# Patient Record
Sex: FEMALE | Race: WHITE | NOT HISPANIC OR LATINO | Employment: STUDENT | URBAN - METROPOLITAN AREA
[De-identification: names, ages, dates, MRNs, and addresses within clinical notes are randomized per-mention and may not be internally consistent; named-entity substitution may affect disease eponyms.]

---

## 2017-01-17 ENCOUNTER — OFFICE VISIT (OUTPATIENT)
Dept: OBSTETRICS AND GYNECOLOGY | Facility: CLINIC | Age: 22
End: 2017-01-17
Payer: MEDICARE

## 2017-01-17 VITALS
WEIGHT: 209 LBS | HEIGHT: 63 IN | SYSTOLIC BLOOD PRESSURE: 122 MMHG | DIASTOLIC BLOOD PRESSURE: 78 MMHG | BODY MASS INDEX: 37.03 KG/M2

## 2017-01-17 DIAGNOSIS — Z01.419 ROUTINE GYNECOLOGICAL EXAMINATION: ICD-10-CM

## 2017-01-17 DIAGNOSIS — Z91.89 GYN EXAM FOR HIGH-RISK MEDICARE PATIENT: Primary | ICD-10-CM

## 2017-01-17 PROCEDURE — G0101 CA SCREEN;PELVIC/BREAST EXAM: HCPCS | Mod: PBBFAC,PO | Performed by: OBSTETRICS & GYNECOLOGY

## 2017-01-17 PROCEDURE — G0101 CA SCREEN;PELVIC/BREAST EXAM: HCPCS | Mod: S$PBB,,, | Performed by: OBSTETRICS & GYNECOLOGY

## 2017-01-17 PROCEDURE — 88175 CYTOPATH C/V AUTO FLUID REDO: CPT

## 2017-01-17 PROCEDURE — 99999 PR PBB SHADOW E&M-EST. PATIENT-LVL III: CPT | Mod: PBBFAC,,, | Performed by: OBSTETRICS & GYNECOLOGY

## 2017-01-17 PROCEDURE — 99213 OFFICE O/P EST LOW 20 MIN: CPT | Mod: PBBFAC,PO | Performed by: OBSTETRICS & GYNECOLOGY

## 2017-01-17 NOTE — PROGRESS NOTES
Chief Complaint   Patient presents with    Well Woman     no problems        History of Present Illness: Tina Case is a 21 y.o. female that presents today 2017 for well gyn visit.    Past Medical History   Diagnosis Date    Allergy     Anxiety     Bipolar disorder     Schizophrenia        Past Surgical History   Procedure Laterality Date    Incontinence surgery         Current Outpatient Prescriptions   Medication Sig Dispense Refill    busPIRone (BUSPAR) 10 MG tablet Take 1 tablet by mouth once daily.      cetirizine (ZYRTEC) 10 MG tablet Take 1 tablet (10 mg total) by mouth once daily. 30 tablet 2    dextroamphetamine-amphetamine (ADDERALL) 20 mg tablet Take 1 tablet by mouth once daily.      escitalopram oxalate (LEXAPRO) 20 MG tablet Take 1 tablet by mouth every evening.  0    guanfacine (TENEX) 2 MG tablet Take 11 tablets by mouth once daily.      metformin (GLUCOPHAGE) 500 MG tablet Take 500 mg by mouth nightly.       No current facility-administered medications for this visit.        Review of patient's allergies indicates:  No Known Allergies    Family History   Problem Relation Age of Onset    Family history unknown: Yes       Social History     Social History    Marital status: Single     Spouse name: N/A    Number of children: N/A    Years of education: N/A     Social History Main Topics    Smoking status: Never Smoker    Smokeless tobacco: Never Used    Alcohol use No    Drug use: No    Sexual activity: No     Other Topics Concern    None     Social History Narrative       OB History    Para Term  AB SAB TAB Ectopic Multiple Living   0                      Review of Symptoms:  GENERAL: Denies weight gain or weight loss. Feeling well overall.   SKIN: Denies rash or lesions.   HEAD: Denies head injury or headache.   NODES: Denies enlarged lymph nodes.   CHEST: Denies chest pain or shortness of breath.   CARDIOVASCULAR: Denies palpitations or left sided chest  "pain.   ABDOMEN: No abdominal pain, constipation, diarrhea, nausea, vomiting or rectal bleeding.   URINARY: No frequency, dysuria, hematuria, or burning on urination.  HEMATOLOGIC: No easy bruisability or excessive bleeding.   MUSCULOSKELETAL: Denies joint pain or swelling.     Visit Vitals    /78    Ht 5' 3" (1.6 m)    Wt 94.8 kg (208 lb 15.9 oz)    LMP 12/03/2016 (Exact Date)     Physical Exam:  APPEARANCE: Well nourished, well developed, in no acute distress.  SKIN: Normal skin turgor, no lesions.  NECK: Neck symmetric without masses   RESPIRATORY: Normal respiratory effort with no retractions or use of accessory muscles  CARDIOVASCULAR: Peripheral vascular system with no swelling no varicosities and palpation of pulses normal  LYMPHATIC: No enlargements of the lymph nodes noted in the neck, axillae, or groin  ABDOMEN: Soft. No tenderness or masses. No hepatosplenomegaly. No hernias.  BREASTS: Symmetrical, no skin changes or visible lesions. No palpable masses, nipple discharge or adenopathy bilaterally.  PELVIC: Normal external female genitalia without lesions. Normal hair distribution. Adequate perineal body, normal urethral meatus. Urethra with no masses.  Bladder nontender. Vagina moist and well rugated without lesions or discharge. Cervix pink and without lesions. No significant cystocele or rectocele. Bimanual exam showed uterus normal size, shape, position, mobile and nontender. Adnexa without masses or tenderness. Urethra and bladder normal. PAP DONE  EXTREMITIES: No clubbing cyanosis or edema.    ASSESSMENT/PLAN:  GYN exam for high-risk Medicare patient    Routine gynecological examination  -     Liquid-based pap smear, screening          Patient was counseled today on Pap guidelines, recommendation for pelvic exams, mammograms every other year after the age of 40 and annually after the age of 50, Colonoscopy after the age of 50, Dexa Bone Scan and calcium and vitamin D supplementation in " menopause and to see her PCP for other health maintenance.   FOLLOW-UP:prn

## 2017-01-17 NOTE — MR AVS SNAPSHOT
[facility] - OB/GYN  [address] [place] [address]  Phone: [phone]                  Tina Case   2017 1:00 PM   Office Visit    Description:  Female : 1995   Provider:  Elizabeth Marvin MD   Department:  [facility] - OB/GYN           Reason for Visit     Well Woman           Diagnoses this Visit        Comments    Routine gynecological examination    -  Primary            To Do List           Goals (5 Years of Data)     None      [facility]     [facility] On Call Nurse Care Line -  Assistance  Registered nurses in the [facility] Call Center provide clinical advisement, health education, appointment booking, and other advisory services.  Call for this free service at [phone].             Medications           Message regarding Medications     Verify the changes and/or additions to your medication regime listed below are the same as discussed with your clinician today.  If any of these changes or additions are incorrect, please notify your healthcare provider.             Verify that the below list of medications is an accurate representation of the medications you are currently taking.  If none reported, the list may be blank. If incorrect, please contact your healthcare provider. Carry this list with you in case of emergency.           Current Medications     busPIRone (BUSPAR) 10 MG tablet Take 1 tablet by mouth once daily.    cetirizine (ZYRTEC) 10 MG tablet Take 1 tablet (10 mg total) by mouth once daily.    dextroamphetamine-amphetamine (ADDERALL) 20 mg tablet Take 1 tablet by mouth once daily.    escitalopram oxalate (LEXAPRO) 20 MG tablet Take 1 tablet by mouth every evening.    guanfacine (TENEX) 2 MG tablet Take 11 tablets by mouth once daily.    metformin (GLUCOPHAGE) 500 MG tablet Take 500 mg by mouth nightly.           Clinical Reference Information           Vital Signs - Last Recorded  Most recent update: 2017  1:10 PM by Daniela MUNSON  "BIN GarcíaN    BP Ht Wt LMP BMI    122/78 5' 3" (1.6 m) 94.8 kg (208 lb 15.9 oz) 12/03/2016 (Exact Date) 37.02 kg/m2      Blood Pressure          Most Recent Value    BP  122/78      Allergies as of 1/17/2017     No Known Allergies      Immunizations Administered on Date of Encounter - 1/17/2017     None      Orders Placed During Today's Visit      Normal Orders This Visit    Liquid-based pap smear, screening       "

## 2017-05-08 PROBLEM — F41.9 ANXIETY AND DEPRESSION: Status: ACTIVE | Noted: 2017-05-08

## 2017-05-08 PROBLEM — F32.A ANXIETY AND DEPRESSION: Status: ACTIVE | Noted: 2017-05-08

## 2017-05-08 PROBLEM — F98.8 ADD (ATTENTION DEFICIT DISORDER): Status: ACTIVE | Noted: 2017-05-08

## 2017-07-13 ENCOUNTER — TELEPHONE (OUTPATIENT)
Dept: OBSTETRICS AND GYNECOLOGY | Facility: CLINIC | Age: 22
End: 2017-07-13

## 2017-07-13 NOTE — TELEPHONE ENCOUNTER
----- Message from Alden Gordon sent at 7/13/2017  8:55 AM CDT -----  Contact: Self-  [phone]  Patient called asking for an approval from the doctor to get new rx cream from a different provider.. The patient had a saliva test done recently. Thanks!

## 2017-07-31 ENCOUNTER — OFFICE VISIT (OUTPATIENT)
Dept: OBSTETRICS AND GYNECOLOGY | Facility: CLINIC | Age: 22
End: 2017-07-31
Payer: MEDICARE

## 2017-07-31 VITALS
HEIGHT: 63 IN | BODY MASS INDEX: 38.79 KG/M2 | SYSTOLIC BLOOD PRESSURE: 104 MMHG | WEIGHT: 218.94 LBS | DIASTOLIC BLOOD PRESSURE: 66 MMHG

## 2017-07-31 DIAGNOSIS — R63.5 WEIGHT GAIN: ICD-10-CM

## 2017-07-31 DIAGNOSIS — N92.1 METRORRHAGIA: Primary | ICD-10-CM

## 2017-07-31 DIAGNOSIS — E66.9 OBESITY (BMI 30-39.9): ICD-10-CM

## 2017-07-31 PROCEDURE — 99213 OFFICE O/P EST LOW 20 MIN: CPT | Mod: S$PBB,,, | Performed by: OBSTETRICS & GYNECOLOGY

## 2017-07-31 PROCEDURE — 99999 PR PBB SHADOW E&M-EST. PATIENT-LVL III: CPT | Mod: PBBFAC,,, | Performed by: OBSTETRICS & GYNECOLOGY

## 2017-07-31 PROCEDURE — 99213 OFFICE O/P EST LOW 20 MIN: CPT | Mod: PBBFAC,PN | Performed by: OBSTETRICS & GYNECOLOGY

## 2017-07-31 RX ORDER — ACETAMINOPHEN AND CODEINE PHOSPHATE 120; 12 MG/5ML; MG/5ML
1 SOLUTION ORAL DAILY
Qty: 30 TABLET | Refills: 11 | Status: SHIPPED | OUTPATIENT
Start: 2017-07-31 | End: 2017-09-22 | Stop reason: SDUPTHER

## 2017-07-31 NOTE — PROGRESS NOTES
Chief Complaint   Patient presents with    discuss HRT       History of Present Illness: Tina Case is a 21 y.o. female that presents today 7/31/2017 for   Chief Complaint   Patient presents with    discuss HRT     She reports light spotting for 1 day periods every 4 months. But, LMP 6/10 for 2 weeks with pad changes every 3 hours for 3 days. She thinks prior to that she had a period in February. She says this has been going on for years. She reports that she has tried OCPs and she felt angry on this.     Past Medical History:   Diagnosis Date    Allergy     Anxiety     Bipolar disorder     Schizophrenia        Past Surgical History:   Procedure Laterality Date    INCONTINENCE SURGERY         Current Outpatient Prescriptions   Medication Sig Dispense Refill    busPIRone (BUSPAR) 10 MG tablet Take 1 tablet by mouth once daily.      dextroamphetamine-amphetamine (ADDERALL) 20 mg tablet Take 1 tablet by mouth once daily.      ergocalciferol (ERGOCALCIFEROL) 50,000 unit Cap Take 1 capsule (50,000 Units total) by mouth every 7 days. 4 capsule 2    escitalopram oxalate (LEXAPRO) 20 MG tablet Take 1 tablet by mouth every evening.  0    guanfacine (TENEX) 2 MG tablet Take 11 tablets by mouth once daily.      metformin (GLUCOPHAGE) 1000 MG tablet Take 1 tablet (1,000 mg total) by mouth 2 (two) times daily with meals. 180 tablet 3    cetirizine (ZYRTEC) 10 MG tablet Take 1 tablet (10 mg total) by mouth once daily. 30 tablet 2    norethindrone (MICRONOR) 0.35 mg tablet Take 1 tablet (0.35 mg total) by mouth once daily. 30 tablet 11     No current facility-administered medications for this visit.        Review of patient's allergies indicates:  No Known Allergies    Family History   Problem Relation Age of Onset    Breast cancer Neg Hx     Ovarian cancer Neg Hx        Social History   Substance Use Topics    Smoking status: Never Smoker    Smokeless tobacco: Never Used    Alcohol use No       OB  "History    Para Term  AB Living   0             SAB TAB Ectopic Multiple Live Births                         Review of Symptoms:  GENERAL: Denies weight gain or weight loss. Feeling well overall.   SKIN: Denies rash or lesions.   HEAD: Denies head injury or headache.   NODES: Denies enlarged lymph nodes.   CHEST: Denies chest pain or shortness of breath.   CARDIOVASCULAR: Denies palpitations or left sided chest pain.   ABDOMEN: No abdominal pain, constipation, diarrhea, nausea, vomiting or rectal bleeding.   URINARY: No frequency, dysuria, hematuria, or burning on urination.  HEMATOLOGIC: No easy bruisability or excessive bleeding.   MUSCULOSKELETAL: Denies joint pain or swelling.     /66   Ht 5' 3" (1.6 m)   Wt 99.3 kg (218 lb 14.7 oz)   LMP 06/10/2017 (Approximate)     ASSESSMENT/PLAN:  Metrorrhagia  -     Follicle stimulating hormone; Future; Expected date: 2017  -     17-HYDROXYPROGESTERONE; Future; Expected date: 2017  -     norethindrone (MICRONOR) 0.35 mg tablet; Take 1 tablet (0.35 mg total) by mouth once daily.  Dispense: 30 tablet; Refill: 11    Weight gain  -     TSH; Future; Expected date: 2017    Obesity (BMI 30-39.9)        15 minutes spent today with this patient. Greater than half spent in counseling today.     "

## 2017-08-19 ENCOUNTER — LAB VISIT (OUTPATIENT)
Dept: LAB | Facility: HOSPITAL | Age: 22
End: 2017-08-19
Attending: OBSTETRICS & GYNECOLOGY
Payer: MEDICARE

## 2017-08-19 DIAGNOSIS — N92.1 METRORRHAGIA: ICD-10-CM

## 2017-08-19 DIAGNOSIS — R63.5 WEIGHT GAIN: ICD-10-CM

## 2017-08-19 LAB
FSH SERPL-ACNC: 5.4 MIU/ML
TSH SERPL DL<=0.005 MIU/L-ACNC: 1.17 UIU/ML

## 2017-08-19 PROCEDURE — 84443 ASSAY THYROID STIM HORMONE: CPT

## 2017-08-19 PROCEDURE — 83498 ASY HYDROXYPROGESTERONE 17-D: CPT

## 2017-08-19 PROCEDURE — 83001 ASSAY OF GONADOTROPIN (FSH): CPT

## 2017-08-19 PROCEDURE — 36415 COLL VENOUS BLD VENIPUNCTURE: CPT | Mod: PO

## 2017-08-24 LAB — 17OHP SERPL-MCNC: 211 NG/DL

## 2017-09-22 ENCOUNTER — OFFICE VISIT (OUTPATIENT)
Dept: OBSTETRICS AND GYNECOLOGY | Facility: CLINIC | Age: 22
End: 2017-09-22
Payer: MEDICARE

## 2017-09-22 VITALS — BODY MASS INDEX: 38.35 KG/M2 | WEIGHT: 216.5 LBS

## 2017-09-22 DIAGNOSIS — N92.1 METRORRHAGIA: Primary | ICD-10-CM

## 2017-09-22 DIAGNOSIS — E66.9 OBESITY (BMI 30-39.9): ICD-10-CM

## 2017-09-22 DIAGNOSIS — R63.5 WEIGHT GAIN: ICD-10-CM

## 2017-09-22 PROCEDURE — 99212 OFFICE O/P EST SF 10 MIN: CPT | Mod: PBBFAC,PN | Performed by: OBSTETRICS & GYNECOLOGY

## 2017-09-22 PROCEDURE — 99213 OFFICE O/P EST LOW 20 MIN: CPT | Mod: S$PBB,,, | Performed by: OBSTETRICS & GYNECOLOGY

## 2017-09-22 PROCEDURE — 99999 PR PBB SHADOW E&M-EST. PATIENT-LVL II: CPT | Mod: PBBFAC,,, | Performed by: OBSTETRICS & GYNECOLOGY

## 2017-09-22 RX ORDER — ARIPIPRAZOLE 5 MG/1
TABLET ORAL
COMMUNITY
Start: 2017-07-26 | End: 2020-05-15

## 2017-09-22 RX ORDER — ACETAMINOPHEN AND CODEINE PHOSPHATE 120; 12 MG/5ML; MG/5ML
1 SOLUTION ORAL DAILY
Qty: 30 TABLET | Refills: 11 | Status: SHIPPED | OUTPATIENT
Start: 2017-09-22 | End: 2019-05-02

## 2017-09-22 NOTE — PROGRESS NOTES
Chief Complaint   Patient presents with    Follow-up     f/u after starting OCP. She reports improvement in cycle and in mood       History of Present Illness: Tina Case is a 22 y.o. female that presents today 2017 for   Chief Complaint   Patient presents with    Follow-up     f/u after starting OCP. She reports improvement in cycle and in mood     She reports that she had a period with the provera. She reports     Past Medical History:   Diagnosis Date    Allergy     Anxiety     Bipolar disorder     Schizophrenia        Past Surgical History:   Procedure Laterality Date    INCONTINENCE SURGERY         Current Outpatient Prescriptions   Medication Sig Dispense Refill    aripiprazole (ABILIFY) 5 MG Tab       busPIRone (BUSPAR) 10 MG tablet Take 1 tablet by mouth once daily.      dextroamphetamine-amphetamine (ADDERALL) 20 mg tablet Take 1 tablet by mouth once daily.      ergocalciferol (ERGOCALCIFEROL) 50,000 unit Cap Take 1 capsule (50,000 Units total) by mouth every 7 days. 4 capsule 2    escitalopram oxalate (LEXAPRO) 20 MG tablet Take 1 tablet by mouth every evening.  0    guanfacine (TENEX) 2 MG tablet Take 11 tablets by mouth once daily.      metformin (GLUCOPHAGE) 1000 MG tablet Take 1 tablet (1,000 mg total) by mouth 2 (two) times daily with meals. 180 tablet 3    norethindrone (MICRONOR) 0.35 mg tablet Take 1 tablet (0.35 mg total) by mouth once daily. 30 tablet 11    cetirizine (ZYRTEC) 10 MG tablet Take 1 tablet (10 mg total) by mouth once daily. 30 tablet 2     No current facility-administered medications for this visit.        Review of patient's allergies indicates:  No Known Allergies    Family History   Problem Relation Age of Onset    Breast cancer Neg Hx     Ovarian cancer Neg Hx        Social History   Substance Use Topics    Smoking status: Never Smoker    Smokeless tobacco: Never Used    Alcohol use No       OB History    Para Term  AB Living    0             SAB TAB Ectopic Multiple Live Births                         Review of Symptoms:  GENERAL: Denies weight gain or weight loss. Feeling well overall.   SKIN: Denies rash or lesions.   HEAD: Denies head injury or headache.   NODES: Denies enlarged lymph nodes.   CHEST: Denies chest pain or shortness of breath.   CARDIOVASCULAR: Denies palpitations or left sided chest pain.   ABDOMEN: No abdominal pain, constipation, diarrhea, nausea, vomiting or rectal bleeding.   URINARY: No frequency, dysuria, hematuria, or burning on urination.  HEMATOLOGIC: No easy bruisability or excessive bleeding.   MUSCULOSKELETAL: Denies joint pain or swelling.     Wt 98.2 kg (216 lb 7.9 oz)   LMP 09/04/2017   Physical Exam:  APPEARANCE: Well nourished, well developed, in no acute distress.  SKIN: Normal skin turgor, no lesions.  NECK: Neck symmetric without masses   RESPIRATORY: Normal respiratory effort with no retractions or use of accessory muscles  CARDIOVASCULAR: Peripheral vascular system with no swelling no varicosities and palpation of pulses normal  LYMPHATIC: No enlargements of the lymph nodes noted in the neck, axillae, or groin  ABDOMEN: Soft. No tenderness or masses. No hepatosplenomegaly. No hernias.  EXTREMITIES: No clubbing cyanosis or edema.    ASSESSMENT/PLAN:  Metrorrhagia  -     norethindrone (MICRONOR) 0.35 mg tablet; Take 1 tablet (0.35 mg total) by mouth once daily.  Dispense: 30 tablet; Refill: 11    Weight gain    Obesity (BMI 30-39.9)      Continue micronor    15 minutes spent today with this patient. Greater than half spent in counseling today.

## 2018-04-02 ENCOUNTER — TELEPHONE (OUTPATIENT)
Dept: OBSTETRICS AND GYNECOLOGY | Facility: CLINIC | Age: 23
End: 2018-04-02

## 2018-04-02 NOTE — TELEPHONE ENCOUNTER
Returned call to pt left message notifying Dr. Marvin is out this week to call back to schedule with another provider in clinic.

## 2019-06-21 ENCOUNTER — OFFICE VISIT (OUTPATIENT)
Dept: OBSTETRICS AND GYNECOLOGY | Facility: CLINIC | Age: 24
End: 2019-06-21
Payer: MEDICARE

## 2019-06-21 VITALS
WEIGHT: 238.31 LBS | BODY MASS INDEX: 42.23 KG/M2 | SYSTOLIC BLOOD PRESSURE: 102 MMHG | HEIGHT: 63 IN | DIASTOLIC BLOOD PRESSURE: 80 MMHG

## 2019-06-21 DIAGNOSIS — R63.5 WEIGHT GAIN: ICD-10-CM

## 2019-06-21 DIAGNOSIS — Z91.89 GYN EXAM FOR HIGH-RISK MEDICARE PATIENT: Primary | ICD-10-CM

## 2019-06-21 DIAGNOSIS — E66.01 CLASS 3 SEVERE OBESITY WITH BODY MASS INDEX (BMI) OF 40.0 TO 44.9 IN ADULT, UNSPECIFIED OBESITY TYPE, UNSPECIFIED WHETHER SERIOUS COMORBIDITY PRESENT: ICD-10-CM

## 2019-06-21 DIAGNOSIS — R45.86 MOOD SWINGS: ICD-10-CM

## 2019-06-21 DIAGNOSIS — N92.1 METRORRHAGIA: ICD-10-CM

## 2019-06-21 PROCEDURE — G0101 PR CA SCREEN;PELVIC/BREAST EXAM: ICD-10-PCS | Mod: S$PBB,,, | Performed by: OBSTETRICS & GYNECOLOGY

## 2019-06-21 PROCEDURE — 99999 PR PBB SHADOW E&M-EST. PATIENT-LVL III: CPT | Mod: PBBFAC,,, | Performed by: OBSTETRICS & GYNECOLOGY

## 2019-06-21 PROCEDURE — 99999 PR PBB SHADOW E&M-EST. PATIENT-LVL III: ICD-10-PCS | Mod: PBBFAC,,, | Performed by: OBSTETRICS & GYNECOLOGY

## 2019-06-21 PROCEDURE — G0101 CA SCREEN;PELVIC/BREAST EXAM: HCPCS | Mod: PBBFAC,PN | Performed by: OBSTETRICS & GYNECOLOGY

## 2019-06-21 PROCEDURE — 88175 CYTOPATH C/V AUTO FLUID REDO: CPT

## 2019-06-21 PROCEDURE — G0101 CA SCREEN;PELVIC/BREAST EXAM: HCPCS | Mod: S$PBB,,, | Performed by: OBSTETRICS & GYNECOLOGY

## 2019-06-21 PROCEDURE — 99213 OFFICE O/P EST LOW 20 MIN: CPT | Mod: PBBFAC,PN,25 | Performed by: OBSTETRICS & GYNECOLOGY

## 2019-06-21 RX ORDER — MEDROXYPROGESTERONE ACETATE 10 MG/1
TABLET ORAL
Qty: 30 TABLET | Refills: 11 | Status: SHIPPED | OUTPATIENT
Start: 2019-06-21 | End: 2020-05-15

## 2019-06-21 NOTE — PROGRESS NOTES
Chief Complaint   Patient presents with    Gynecologic Exam     possibly wants low dose birth control       History of Present Illness: Tina Case is a 23 y.o. female that presents today 6/21/2019 for well gyn visit. She reports off her OCPS for the last years. She reports 8 times in the last year she had 8 episodes of light bleeding. She reports that she felt mood swings with the OCPs and stopped the the OCPs. She reports that she is over eating due to depression. She sees Dr. Carvajal regularly.     Past Medical History:   Diagnosis Date    Allergy     Anxiety     Bipolar disorder     Schizophrenia        Past Surgical History:   Procedure Laterality Date    BLADDER SURGERY      INCONTINENCE SURGERY         Current Outpatient Medications   Medication Sig Dispense Refill    aripiprazole (ABILIFY) 5 MG Tab       busPIRone (BUSPAR) 10 MG tablet Take 1 tablet by mouth once daily.      dextroamphetamine-amphetamine (ADDERALL) 20 mg tablet Take 1 tablet by mouth once daily.      ergocalciferol (ERGOCALCIFEROL) 50,000 unit Cap Take 1 capsule (50,000 Units total) by mouth every 7 days. 4 capsule 2    escitalopram oxalate (LEXAPRO) 20 MG tablet Take 1 tablet by mouth every evening.  0    guanFACINE (TENEX) 1 MG Tab Take 1 mg by mouth 2 (two) times daily.      hydrOXYzine pamoate (VISTARIL) 25 MG Cap Take 1 capsule (25 mg total) by mouth every 8 (eight) hours as needed. 45 capsule 0    metFORMIN (GLUCOPHAGE) 1000 MG tablet Take 1,000 mg by mouth 2 (two) times daily.      cetirizine (ZYRTEC) 10 MG tablet Take 1 tablet (10 mg total) by mouth once daily. 30 tablet 2    medroxyPROGESTERone (PROVERA) 10 MG tablet Use on days 14 to 24 of the cycle 30 tablet 11     No current facility-administered medications for this visit.        Review of patient's allergies indicates:  No Known Allergies    Family History   Problem Relation Age of Onset    Breast cancer Neg Hx     Ovarian cancer Neg Hx   "      Social History     Socioeconomic History    Marital status: Single     Spouse name: Not on file    Number of children: Not on file    Years of education: Not on file    Highest education level: Not on file   Occupational History    Not on file   Social Needs    Financial resource strain: Not on file    Food insecurity:     Worry: Not on file     Inability: Not on file    Transportation needs:     Medical: Not on file     Non-medical: Not on file   Tobacco Use    Smoking status: Never Smoker    Smokeless tobacco: Never Used   Substance and Sexual Activity    Alcohol use: No     Alcohol/week: 0.0 oz    Drug use: No    Sexual activity: Never     Birth control/protection: None   Lifestyle    Physical activity:     Days per week: Not on file     Minutes per session: Not on file    Stress: Not on file   Relationships    Social connections:     Talks on phone: Not on file     Gets together: Not on file     Attends Latter-day service: Not on file     Active member of club or organization: Not on file     Attends meetings of clubs or organizations: Not on file     Relationship status: Not on file   Other Topics Concern    Not on file   Social History Narrative    Not on file       OB History    Para Term  AB Living   0             SAB TAB Ectopic Multiple Live Births                   Review of Symptoms:  GENERAL: Denies weight gain or weight loss. Feeling well overall.   SKIN: Denies rash or lesions.   HEAD: Denies head injury or headache.   NODES: Denies enlarged lymph nodes.   CHEST: Denies chest pain or shortness of breath.   CARDIOVASCULAR: Denies palpitations or left sided chest pain.   ABDOMEN: No abdominal pain, constipation, diarrhea, nausea, vomiting or rectal bleeding.   URINARY: No frequency, dysuria, hematuria, or burning on urination.  HEMATOLOGIC: No easy bruisability or excessive bleeding.   MUSCULOSKELETAL: Denies joint pain or swelling.     /80   Ht 5' 3" (1.6 m)  "  Wt 108.1 kg (238 lb 5.1 oz)   LMP  (LMP Unknown)   Physical Exam:  APPEARANCE: Well nourished, well developed, in no acute distress.  SKIN: Normal skin turgor, no lesions.  NECK: Neck symmetric without masses   RESPIRATORY: Normal respiratory effort with no retractions or use of accessory muscles  CARDIOVASCULAR: Peripheral vascular system with no swelling no varicosities and palpation of pulses normal  LYMPHATIC: No enlargements of the lymph nodes noted in the neck, axillae, or groin  ABDOMEN: Soft. No tenderness or masses. No hepatosplenomegaly. No hernias.  BREASTS: Symmetrical, no skin changes or visible lesions. No palpable masses, nipple discharge or adenopathy bilaterally.  PELVIC: Normal external female genitalia without lesions. Normal hair distribution. Adequate perineal body, normal urethral meatus. Urethra with no masses.  Bladder nontender. Vagina moist and well rugated without lesions or discharge. Cervix pink and without lesions. No significant cystocele or rectocele. Bimanual exam showed uterus normal size, shape, position, mobile and nontender. Adnexa without masses or tenderness. Urethra and bladder normal.   EXTREMITIES: No clubbing cyanosis or edema.    ASSESSMENT/PLAN:  GYN exam for high-risk Medicare patient  -     Liquid-based pap smear, screening    Metrorrhagia-mood changes with OCP, not sexually active and will try cyclic provera   -     medroxyPROGESTERone (PROVERA) 10 MG tablet; Use on days 14 to 24 of the cycle  Dispense: 30 tablet; Refill: 11    Mood swings    Weight gain    Class 3 severe obesity with body mass index (BMI) of 40.0 to 44.9 in adult, unspecified obesity type, unspecified whether serious comorbidity present          Patient was counseled today on Pelvic exams and Pap Smear guidelines.   We discussed STD screening if at high risk for a STD.  We discussed recommendation for breast cancer screening with mammogram every other year after the age of 40 and annually after the  age of 50.    We discussed colon cancer screening when indicated.   Osteoporosis screening discussed when indicated.   She was advised to see her primary care physician for all other health maintenance.     FOLLOW-UP with me for next routine visit.

## 2019-07-25 ENCOUNTER — TELEPHONE (OUTPATIENT)
Dept: OBSTETRICS AND GYNECOLOGY | Facility: CLINIC | Age: 24
End: 2019-07-25

## 2019-07-25 NOTE — TELEPHONE ENCOUNTER
----- Message from Joesph Chahal sent at 7/25/2019  9:45 AM CDT -----  Contact: patient  Type:  Sooner Apoointment Request    Caller is requesting a sooner appointment.  Caller declined first available appointment listed below.  Caller will not accept being placed on the waitlist and is requesting a message be sent to doctor.    Name of Caller:  pete  When is the first available appointment?  8-20-19    Best Call Back Number:  [phone]  Additional Information:  Trying to come in for med refill will be moving to [facility] soon and want to see Dr Marvin before then, please call

## 2020-05-15 PROBLEM — E28.2 PCOS (POLYCYSTIC OVARIAN SYNDROME): Status: ACTIVE | Noted: 2020-05-15

## 2020-05-15 PROBLEM — E55.9 VITAMIN D DEFICIENCY: Status: ACTIVE | Noted: 2020-05-15

## 2020-08-14 ENCOUNTER — TELEPHONE (OUTPATIENT)
Dept: OBSTETRICS AND GYNECOLOGY | Facility: CLINIC | Age: 25
End: 2020-08-14

## 2020-08-14 NOTE — TELEPHONE ENCOUNTER
----- Message from Yon Orozco sent at 8/14/2020 12:02 PM CDT -----  Regarding: pt  Type:  Sooner Apoointment Request    Caller is requesting a sooner appointment.      Name of Caller:  pt  When is the first available appointment?  NONE  Symptoms:  WWE, Birth Control Refill  Best Call Back Number:  [phone]   Additional Information:  pt last seen 6/19/19

## 2020-12-14 ENCOUNTER — TELEPHONE (OUTPATIENT)
Dept: SURGERY | Facility: CLINIC | Age: 25
End: 2020-12-14

## 2020-12-14 NOTE — TELEPHONE ENCOUNTER
----- Message from Joana Franklin sent at 12/14/2020 11:54 AM CST -----  Regarding: Call back  Contact: [phone]  Patient is requesting to talk to nurse in regards to scheduling a weight loss surgery. Please advice

## 2021-05-10 ENCOUNTER — PATIENT MESSAGE (OUTPATIENT)
Dept: RESEARCH | Facility: HOSPITAL | Age: 26
End: 2021-05-10

## 2021-05-14 PROBLEM — J30.1 SEASONAL ALLERGIC RHINITIS DUE TO POLLEN: Status: ACTIVE | Noted: 2021-05-14

## 2021-05-14 PROBLEM — F31.9 BIPOLAR 1 DISORDER: Status: ACTIVE | Noted: 2021-05-14

## 2021-05-24 PROBLEM — K76.0 STEATOSIS OF LIVER: Status: ACTIVE | Noted: 2021-05-24

## 2021-05-24 PROBLEM — G47.33 OBSTRUCTIVE SLEEP APNEA SYNDROME: Status: ACTIVE | Noted: 2021-05-24

## 2022-05-10 ENCOUNTER — LAB VISIT (OUTPATIENT)
Dept: LAB | Facility: HOSPITAL | Age: 27
End: 2022-05-10
Attending: STUDENT IN AN ORGANIZED HEALTH CARE EDUCATION/TRAINING PROGRAM
Payer: MEDICARE

## 2022-05-10 ENCOUNTER — OFFICE VISIT (OUTPATIENT)
Dept: OBSTETRICS AND GYNECOLOGY | Facility: CLINIC | Age: 27
End: 2022-05-10
Payer: MEDICARE

## 2022-05-10 VITALS
WEIGHT: 246.94 LBS | BODY MASS INDEX: 43.74 KG/M2 | DIASTOLIC BLOOD PRESSURE: 94 MMHG | SYSTOLIC BLOOD PRESSURE: 124 MMHG

## 2022-05-10 DIAGNOSIS — N93.9 ABNORMAL UTERINE BLEEDING: Primary | ICD-10-CM

## 2022-05-10 DIAGNOSIS — F31.31 BIPOLAR DISORDER, CURRENT EPISODE DEPRESSED, MILD: ICD-10-CM

## 2022-05-10 DIAGNOSIS — E28.2 PCOS (POLYCYSTIC OVARIAN SYNDROME): ICD-10-CM

## 2022-05-10 DIAGNOSIS — Z30.019 ENCOUNTER FOR INITIAL PRESCRIPTION OF CONTRACEPTIVES, UNSPECIFIED CONTRACEPTIVE: ICD-10-CM

## 2022-05-10 DIAGNOSIS — E74.819 DISORDERS OF GLUCOSE TRANSPORT, UNSPECIFIED: ICD-10-CM

## 2022-05-10 LAB
B-HCG UR QL: NEGATIVE
CTP QC/QA: YES
ESTIMATED AVG GLUCOSE: 108 MG/DL (ref 68–131)
HBA1C MFR BLD: 5.4 % (ref 4–5.6)
PROLACTIN SERPL IA-MCNC: 11.3 NG/ML (ref 5.2–26.5)
TSH SERPL DL<=0.005 MIU/L-ACNC: 1.93 UIU/ML (ref 0.4–4)

## 2022-05-10 PROCEDURE — 36415 COLL VENOUS BLD VENIPUNCTURE: CPT | Mod: PN | Performed by: STUDENT IN AN ORGANIZED HEALTH CARE EDUCATION/TRAINING PROGRAM

## 2022-05-10 PROCEDURE — 83036 HEMOGLOBIN GLYCOSYLATED A1C: CPT | Performed by: STUDENT IN AN ORGANIZED HEALTH CARE EDUCATION/TRAINING PROGRAM

## 2022-05-10 PROCEDURE — 81025 POCT URINE PREGNANCY: ICD-10-PCS | Mod: S$GLB,,, | Performed by: STUDENT IN AN ORGANIZED HEALTH CARE EDUCATION/TRAINING PROGRAM

## 2022-05-10 PROCEDURE — 84402 ASSAY OF FREE TESTOSTERONE: CPT | Performed by: STUDENT IN AN ORGANIZED HEALTH CARE EDUCATION/TRAINING PROGRAM

## 2022-05-10 PROCEDURE — 1159F MED LIST DOCD IN RCRD: CPT | Mod: CPTII,S$GLB,, | Performed by: STUDENT IN AN ORGANIZED HEALTH CARE EDUCATION/TRAINING PROGRAM

## 2022-05-10 PROCEDURE — 3080F PR MOST RECENT DIASTOLIC BLOOD PRESSURE >= 90 MM HG: ICD-10-PCS | Mod: CPTII,S$GLB,, | Performed by: STUDENT IN AN ORGANIZED HEALTH CARE EDUCATION/TRAINING PROGRAM

## 2022-05-10 PROCEDURE — 84443 ASSAY THYROID STIM HORMONE: CPT | Performed by: STUDENT IN AN ORGANIZED HEALTH CARE EDUCATION/TRAINING PROGRAM

## 2022-05-10 PROCEDURE — 3008F PR BODY MASS INDEX (BMI) DOCUMENTED: ICD-10-PCS | Mod: CPTII,S$GLB,, | Performed by: STUDENT IN AN ORGANIZED HEALTH CARE EDUCATION/TRAINING PROGRAM

## 2022-05-10 PROCEDURE — 82627 DEHYDROEPIANDROSTERONE: CPT | Performed by: STUDENT IN AN ORGANIZED HEALTH CARE EDUCATION/TRAINING PROGRAM

## 2022-05-10 PROCEDURE — 99999 PR PBB SHADOW E&M-EST. PATIENT-LVL III: CPT | Mod: PBBFAC,,, | Performed by: STUDENT IN AN ORGANIZED HEALTH CARE EDUCATION/TRAINING PROGRAM

## 2022-05-10 PROCEDURE — 81025 URINE PREGNANCY TEST: CPT | Mod: S$GLB,,, | Performed by: STUDENT IN AN ORGANIZED HEALTH CARE EDUCATION/TRAINING PROGRAM

## 2022-05-10 PROCEDURE — 99999 PR PBB SHADOW E&M-EST. PATIENT-LVL III: ICD-10-PCS | Mod: PBBFAC,,, | Performed by: STUDENT IN AN ORGANIZED HEALTH CARE EDUCATION/TRAINING PROGRAM

## 2022-05-10 PROCEDURE — 3008F BODY MASS INDEX DOCD: CPT | Mod: CPTII,S$GLB,, | Performed by: STUDENT IN AN ORGANIZED HEALTH CARE EDUCATION/TRAINING PROGRAM

## 2022-05-10 PROCEDURE — 1159F PR MEDICATION LIST DOCUMENTED IN MEDICAL RECORD: ICD-10-PCS | Mod: CPTII,S$GLB,, | Performed by: STUDENT IN AN ORGANIZED HEALTH CARE EDUCATION/TRAINING PROGRAM

## 2022-05-10 PROCEDURE — 3074F SYST BP LT 130 MM HG: CPT | Mod: CPTII,S$GLB,, | Performed by: STUDENT IN AN ORGANIZED HEALTH CARE EDUCATION/TRAINING PROGRAM

## 2022-05-10 PROCEDURE — 84146 ASSAY OF PROLACTIN: CPT | Performed by: STUDENT IN AN ORGANIZED HEALTH CARE EDUCATION/TRAINING PROGRAM

## 2022-05-10 PROCEDURE — 3080F DIAST BP >= 90 MM HG: CPT | Mod: CPTII,S$GLB,, | Performed by: STUDENT IN AN ORGANIZED HEALTH CARE EDUCATION/TRAINING PROGRAM

## 2022-05-10 PROCEDURE — 99214 PR OFFICE/OUTPT VISIT, EST, LEVL IV, 30-39 MIN: ICD-10-PCS | Mod: S$GLB,,, | Performed by: STUDENT IN AN ORGANIZED HEALTH CARE EDUCATION/TRAINING PROGRAM

## 2022-05-10 PROCEDURE — 99214 OFFICE O/P EST MOD 30 MIN: CPT | Mod: S$GLB,,, | Performed by: STUDENT IN AN ORGANIZED HEALTH CARE EDUCATION/TRAINING PROGRAM

## 2022-05-10 PROCEDURE — 3074F PR MOST RECENT SYSTOLIC BLOOD PRESSURE < 130 MM HG: ICD-10-PCS | Mod: CPTII,S$GLB,, | Performed by: STUDENT IN AN ORGANIZED HEALTH CARE EDUCATION/TRAINING PROGRAM

## 2022-05-10 RX ORDER — NORETHINDRONE ACETATE AND ETHINYL ESTRADIOL 1MG-20(21)
1 KIT ORAL DAILY
Qty: 30 TABLET | Refills: 11 | Status: SHIPPED | OUTPATIENT
Start: 2022-05-10 | End: 2022-06-17

## 2022-05-10 NOTE — PROGRESS NOTES
Patient, Tina Case (MRN #[id number]), presented with a recorded BMI of 43.74 kg/m^2 consistent with the definition of morbid obesity (ICD-10 E66.01). The patient's morbid obesity was monitored, evaluated, addressed and/or treated. This addendum to the medical record is made on 05/10/2022.

## 2022-05-10 NOTE — PROGRESS NOTES
History & Physical  Gynecology      SUBJECTIVE:     Chief Complaint: Establish Care, Menorrhagia, and Metrorrhagia       History of Present Illness:    26 y.o. here today for AUB. Menarche at 10 or 11, has always had irregular periods. Has period every 6 months and bleeds heavy for 2 months at a time. Not sexually active, never has been. Denies any STI.     + acne, + weight gain, + hair growth.     Review of patient's allergies indicates:  No Known Allergies    Past Medical History:   Diagnosis Date    Allergy     Anxiety     Bipolar disorder     Schizophrenia      Past Surgical History:   Procedure Laterality Date    BLADDER SURGERY      INCONTINENCE SURGERY       OB History        0    Para        Term                AB        Living           SAB        IAB        Ectopic        Multiple        Live Births                   Family History   Problem Relation Age of Onset    Breast cancer Neg Hx     Ovarian cancer Neg Hx      Social History     Tobacco Use    Smoking status: Never Smoker    Smokeless tobacco: Never Used   Substance Use Topics    Alcohol use: No     Alcohol/week: 0.0 standard drinks    Drug use: No       Current Outpatient Medications   Medication Sig    busPIRone (BUSPAR) 10 MG tablet Take 1 tablet by mouth once daily.    cetirizine (ZYRTEC) 10 MG tablet Take 1 tablet (10 mg total) by mouth once daily.    dextroamphetamine-amphetamine (ADDERALL) 20 mg tablet Take 1 tablet by mouth once daily.    escitalopram oxalate (LEXAPRO) 20 MG tablet Take 1 tablet (20 mg total) by mouth every evening.    guanFACINE (TENEX) 1 MG Tab Take 1 mg by mouth 2 (two) times daily.    guanFACINE (TENEX) 2 MG tablet     metFORMIN (GLUCOPHAGE) 1000 MG tablet Take 1 tablet (1,000 mg total) by mouth 2 (two) times daily with meals.    norethindrone-ethinyl estradiol (BLISOVI FE , ,) 1 mg-20 mcg (21)/75 mg (7) per tablet Take 1 tablet by mouth once daily.    TETRAcaine HCL 0.5%  (PONTOCAINE) 0.5 % ophthalmic solution 1-2 gtts A.D. every 2-4hrs as needed for pain control (OK to use in EARS)     No current facility-administered medications for this visit.         Review of Systems:  Review of Systems   Constitutional: Negative for chills, fatigue and fever.   HENT: Negative for congestion.    Eyes: Negative for visual disturbance.   Respiratory: Negative for cough and shortness of breath.    Cardiovascular: Negative for chest pain and palpitations.   Gastrointestinal: Negative for abdominal distention, abdominal pain, constipation, diarrhea, nausea and vomiting.   Genitourinary: Negative for difficulty urinating, dysuria, hematuria, vaginal bleeding and vaginal discharge.   Skin: Negative for rash.   Neurological: Negative for dizziness, seizures, light-headedness and headaches.   Hematological: Does not bruise/bleed easily.   Psychiatric/Behavioral: Negative for dysphoric mood. The patient is not nervous/anxious.         OBJECTIVE:     Physical Exam:  Physical Exam  Vitals reviewed.   Constitutional:       General: She is not in acute distress.     Appearance: Normal appearance. She is well-developed. She is obese.   HENT:      Head: Normocephalic and atraumatic.   Cardiovascular:      Rate and Rhythm: Normal rate and regular rhythm.   Pulmonary:      Effort: Pulmonary effort is normal.   Abdominal:      General: There is no distension.      Palpations: Abdomen is soft.   Genitourinary:     Vagina: Normal.   Skin:     General: Skin is warm.   Neurological:      Mental Status: She is alert and oriented to person, place, and time.   Psychiatric:         Behavior: Behavior normal.         Thought Content: Thought content normal.         Judgment: Judgment normal.           ASSESSMENT:       ICD-10-CM ICD-9-CM    1. Abnormal uterine bleeding  N93.9 626.9 POCT urine pregnancy   2. PCOS (polycystic ovarian syndrome)  E28.2 256.4 TSH      Prolactin      DHEA-Sulfate      Testosterone, Free      US  Pelvis Comp with Transvag NON-OB (xpd      Hemoglobin A1C   3. Bipolar disorder, current episode depressed, mild   F31.31 296.51 TSH   4. Disorders of glucose transport, unspecified   E74.819 271.8 Hemoglobin A1C   5. Encounter for initial prescription of contraceptives, unspecified contraceptive  Z30.019 V25.02 POCT urine pregnancy       Orders Placed This Encounter   Procedures    US Pelvis Comp with Transvag NON-OB (xpd     Standing Status:   Future     Standing Expiration Date:   5/10/2023     Order Specific Question:   May the Radiologist modify the order per protocol to meet the clinical needs of the patient?     Answer:   Yes     Order Specific Question:   Release to patient     Answer:   Immediate    TSH     Standing Status:   Future     Number of Occurrences:   1     Standing Expiration Date:   5/10/2023    Prolactin     Standing Status:   Future     Number of Occurrences:   1     Standing Expiration Date:   7/9/2023    DHEA-Sulfate     Standing Status:   Future     Number of Occurrences:   1     Standing Expiration Date:   7/9/2023    Testosterone, Free     Standing Status:   Future     Number of Occurrences:   1     Standing Expiration Date:   5/10/2023    Hemoglobin A1C     Standing Status:   Future     Number of Occurrences:   1     Standing Expiration Date:   7/9/2023    POCT urine pregnancy           Plan:      AUB:  - Patient was counseled today on the causes of AUB including structural and non-structural: fibroids, polyps, adenomyosis and anovulation resulting in endometrial hyperplasia, endometritis, cervicitis; the probable need for a proper evaluation and for a tissue diagnosis was discussed.  A Hysteroscopy D/C  may be necessary. The need for a bHCG, CBC, TSH, cultures, and TVUS was discussed. Further workup may be indicated if suspect patient has a hemophilia. The hormonal treatment options for menorrhagia include: various progesterones,  OCPs + NSAID, IUD-Mirena, TXA, and elagolix. The  pros, cons, risks, and benefits of each was discussed. We discussed that if based off of test results or that if medical management fails, other options may include surgery. These include myomectomy, uterine artery embolization,endometrial ablation and Hysterectomy, each was discussed in detail.   During the consultation session, patient was given the opportunity to ask questions and all of her questions were answered.    PCOS:  - Discussed with patient criteria for PCOS including irregular periods, polycystic ovaries on US, and signs of hirsutism. Discussed we don't know if it is cysts on ovaries causing hormonal imbalance vs hormonal imbalance causing PCOS ovaries. Discussed goal for PCOS is to regulate menstrual cycle as not having a regular withdrawal bleed unless on OCPs or progesterone increases the risk of EIN and endometrial cancer. We will do blood work today to r/o other metabolic causes. We will also check insulin resistance. Discussed she may meet criteria for metformin which has been shown to decrease insulin resistance in PCOS. Discussed importance of healthy diet and weight loss and this can also help regulate menstrual cycles. We also dicussed sometimes patient with PCOS can have difficulty conceiving but there are certain methods we can try to help with this in the future. Dicussed this is a multi-disciplinarian approach. For hair growth, aldactone can help but definitive treatment is weight control and laser hair loss removal. During the consultation session, patient was given the opportunity to ask questions and all of her questions were answered.  - labs. TVUS today  - start on OCPs  - f/u in 3 months     Roughly 45 mins spent with patient, chart review, reviewing labs and previous notes as well as documentation of the encounter today.     Merced Deluna M.D.  Obstetrics and Gynecology

## 2022-05-11 LAB — DHEA-S SERPL-MCNC: 207.5 UG/DL (ref 95.8–511.7)

## 2022-05-18 ENCOUNTER — TELEPHONE (OUTPATIENT)
Dept: OBSTETRICS AND GYNECOLOGY | Facility: CLINIC | Age: 27
End: 2022-05-18
Payer: MEDICARE

## 2022-05-18 NOTE — TELEPHONE ENCOUNTER
----- Message from Dalia Carballo sent at 5/18/2022  9:59 AM CDT -----  Contact: [phone]  Type: Needs Medical Advice  Who Called:  Bob    Best Call Back Number: [phone]    Additional Information: Pt is calling to reschedule her appt for ultrasound that was missed on 5/17

## 2022-05-20 ENCOUNTER — HOSPITAL ENCOUNTER (OUTPATIENT)
Dept: RADIOLOGY | Facility: HOSPITAL | Age: 27
Discharge: HOME | End: 2022-05-20
Attending: STUDENT IN AN ORGANIZED HEALTH CARE EDUCATION/TRAINING PROGRAM
Payer: MEDICARE

## 2022-05-20 DIAGNOSIS — E28.2 PCOS (POLYCYSTIC OVARIAN SYNDROME): ICD-10-CM

## 2022-05-20 PROCEDURE — 76856 US PELVIS COMP WITH TRANSVAG NON-OB (XPD): ICD-10-PCS | Mod: 26,,, | Performed by: RADIOLOGY

## 2022-05-20 PROCEDURE — 76830 US PELVIS COMP WITH TRANSVAG NON-OB (XPD): ICD-10-PCS | Mod: 26,,, | Performed by: RADIOLOGY

## 2022-05-20 PROCEDURE — 76856 US EXAM PELVIC COMPLETE: CPT | Mod: 26,,, | Performed by: RADIOLOGY

## 2022-05-20 PROCEDURE — 76830 TRANSVAGINAL US NON-OB: CPT | Mod: TC,PN

## 2022-05-20 PROCEDURE — 76830 TRANSVAGINAL US NON-OB: CPT | Mod: 26,,, | Performed by: RADIOLOGY

## 2022-05-27 ENCOUNTER — TELEPHONE (OUTPATIENT)
Dept: OBSTETRICS AND GYNECOLOGY | Facility: CLINIC | Age: 27
End: 2022-05-27
Payer: MEDICARE

## 2022-05-27 NOTE — TELEPHONE ENCOUNTER
Patient concerned about bleeding from period. Informed her doctor usually likes for patients to try birth control for 3 months and then go from there. Patient verbalized understanding.     ----- Message from Rissa Kessler, Patient Care Assistant sent at 5/27/2022 11:37 AM CDT -----  Regarding: advice  Contact: pt  Type: Needs Medical Advice  Who Called:  pt   Symptoms (please be specific):  bleeding   Pharmacy name and phone #:    [facility] [place] [facility] [address] [facility] [address]  [address] [place] [address]  Phone: [phone] Fax: [phone]    Best Call Back Number: [phone] (home)     Additional Information: pt states she would like a callback regarding her birth control. Thanks!

## 2022-05-31 ENCOUNTER — TELEPHONE (OUTPATIENT)
Dept: OBSTETRICS AND GYNECOLOGY | Facility: CLINIC | Age: 27
End: 2022-05-31
Payer: MEDICARE

## 2022-05-31 NOTE — TELEPHONE ENCOUNTER
----- Message from Rosemary Carrasco sent at 5/31/2022 12:30 PM CDT -----  Contact: Patient  Type:  Test Results    Who Called:  Patient   '  Name of Test (Lab/Mammo/Etc):  Ultrasound and blood     Date of Test: 5/20    Ordering Provider:  Merced Deluna     Where the test was performed:   [facility]     Would the patient rather a call back or a response via [facility]?   Call    Best Call Back Number:  [phone] (home)     Additional Information:

## 2022-06-01 LAB — TESTOST FREE SERPL-MCNC: NORMAL PG/ML

## 2022-06-17 ENCOUNTER — TELEPHONE (OUTPATIENT)
Dept: OBSTETRICS AND GYNECOLOGY | Facility: CLINIC | Age: 27
End: 2022-06-17
Payer: MEDICARE

## 2022-06-17 RX ORDER — NORETHINDRONE ACETATE AND ETHINYL ESTRADIOL, ETHINYL ESTRADIOL AND FERROUS FUMARATE 1MG-10(24)
1 KIT ORAL DAILY
Qty: 28 TABLET | Refills: 12 | Status: SHIPPED | OUTPATIENT
Start: 2022-06-17 | End: 2022-06-22

## 2022-06-17 NOTE — TELEPHONE ENCOUNTER
----- Message from Litzy Parks sent at 6/17/2022 10:51 AM CDT -----  Type: Needs Medical Advice  Who Called:  Tina  Symptoms (please be specific):  She is requesting a reduction in dose of the birth control  She said she does not feel like the same person. Mood changed, more angry and loss of motivation  How long has patient had these symptoms: since the beginning of June   Pharmacy name and phone #:   [facility] [address] [facility] [address]  [address] [address] [address]  Phone: [phone] Fax: [phone]      Best Call Back Number: [phone]  Additional Information: thank you

## 2022-06-22 ENCOUNTER — TELEPHONE (OUTPATIENT)
Dept: OBSTETRICS AND GYNECOLOGY | Facility: CLINIC | Age: 27
End: 2022-06-22
Payer: MEDICARE

## 2022-06-22 RX ORDER — LEVONORGESTREL AND ETHINYL ESTRADIOL 0.15-0.03
1 KIT ORAL DAILY
Qty: 90 TABLET | Refills: 3 | Status: SHIPPED | OUTPATIENT
Start: 2022-06-22 | End: 2022-10-18 | Stop reason: ALTCHOICE

## 2022-06-22 NOTE — TELEPHONE ENCOUNTER
----- Message from Usha Rodgers LPN sent at 6/22/2022 10:22 AM CDT -----  Regarding: FW: PA for Rx  Pharmacy waiting on a PA for patient.   ----- Message -----  From: Christie Winchester  Sent: 6/22/2022  10:04 AM CDT  To: Regi KISER Staff  Subject: PA for Rx                                        Type: Needs Medical Advice    Who Called:      Best Call Back Number:     Additional Information: Requesting a call back from Nurse, regarding pharmacy is still waiting on PA from office for RX norethindrone-e.estradioL-iron (LO LOESTRIN FE) 1 mg-10 mcg (24)/10 mcg (2) Tab in order to be filled and insurance to pay for it ,please advise and call     [facility] - [place]  [address] [facility] [address]   Phone:  [phone]  Fax:  [phone]

## 2022-10-18 ENCOUNTER — OFFICE VISIT (OUTPATIENT)
Dept: OBSTETRICS AND GYNECOLOGY | Facility: CLINIC | Age: 27
End: 2022-10-18
Payer: MEDICARE

## 2022-10-18 VITALS
WEIGHT: 241.88 LBS | BODY MASS INDEX: 42.84 KG/M2 | DIASTOLIC BLOOD PRESSURE: 82 MMHG | SYSTOLIC BLOOD PRESSURE: 122 MMHG

## 2022-10-18 DIAGNOSIS — E28.2 PCOS (POLYCYSTIC OVARIAN SYNDROME): Primary | ICD-10-CM

## 2022-10-18 PROCEDURE — 1159F PR MEDICATION LIST DOCUMENTED IN MEDICAL RECORD: ICD-10-PCS | Mod: CPTII,S$GLB,, | Performed by: STUDENT IN AN ORGANIZED HEALTH CARE EDUCATION/TRAINING PROGRAM

## 2022-10-18 PROCEDURE — 99213 OFFICE O/P EST LOW 20 MIN: CPT | Mod: S$GLB,,, | Performed by: STUDENT IN AN ORGANIZED HEALTH CARE EDUCATION/TRAINING PROGRAM

## 2022-10-18 PROCEDURE — 3044F PR MOST RECENT HEMOGLOBIN A1C LEVEL <7.0%: ICD-10-PCS | Mod: CPTII,S$GLB,, | Performed by: STUDENT IN AN ORGANIZED HEALTH CARE EDUCATION/TRAINING PROGRAM

## 2022-10-18 PROCEDURE — 3079F PR MOST RECENT DIASTOLIC BLOOD PRESSURE 80-89 MM HG: ICD-10-PCS | Mod: CPTII,S$GLB,, | Performed by: STUDENT IN AN ORGANIZED HEALTH CARE EDUCATION/TRAINING PROGRAM

## 2022-10-18 PROCEDURE — 3044F HG A1C LEVEL LT 7.0%: CPT | Mod: CPTII,S$GLB,, | Performed by: STUDENT IN AN ORGANIZED HEALTH CARE EDUCATION/TRAINING PROGRAM

## 2022-10-18 PROCEDURE — 3074F PR MOST RECENT SYSTOLIC BLOOD PRESSURE < 130 MM HG: ICD-10-PCS | Mod: CPTII,S$GLB,, | Performed by: STUDENT IN AN ORGANIZED HEALTH CARE EDUCATION/TRAINING PROGRAM

## 2022-10-18 PROCEDURE — 99213 PR OFFICE/OUTPT VISIT, EST, LEVL III, 20-29 MIN: ICD-10-PCS | Mod: S$GLB,,, | Performed by: STUDENT IN AN ORGANIZED HEALTH CARE EDUCATION/TRAINING PROGRAM

## 2022-10-18 PROCEDURE — 3074F SYST BP LT 130 MM HG: CPT | Mod: CPTII,S$GLB,, | Performed by: STUDENT IN AN ORGANIZED HEALTH CARE EDUCATION/TRAINING PROGRAM

## 2022-10-18 PROCEDURE — 3079F DIAST BP 80-89 MM HG: CPT | Mod: CPTII,S$GLB,, | Performed by: STUDENT IN AN ORGANIZED HEALTH CARE EDUCATION/TRAINING PROGRAM

## 2022-10-18 PROCEDURE — 1159F MED LIST DOCD IN RCRD: CPT | Mod: CPTII,S$GLB,, | Performed by: STUDENT IN AN ORGANIZED HEALTH CARE EDUCATION/TRAINING PROGRAM

## 2022-10-18 PROCEDURE — 99999 PR PBB SHADOW E&M-EST. PATIENT-LVL III: ICD-10-PCS | Mod: PBBFAC,,, | Performed by: STUDENT IN AN ORGANIZED HEALTH CARE EDUCATION/TRAINING PROGRAM

## 2022-10-18 PROCEDURE — 99999 PR PBB SHADOW E&M-EST. PATIENT-LVL III: CPT | Mod: PBBFAC,,, | Performed by: STUDENT IN AN ORGANIZED HEALTH CARE EDUCATION/TRAINING PROGRAM

## 2022-10-18 RX ORDER — DROSPIRENONE AND ETHINYL ESTRADIOL 0.03MG-3MG
1 KIT ORAL DAILY
Qty: 90 TABLET | Refills: 3 | Status: SHIPPED | OUTPATIENT
Start: 2022-10-18 | End: 2023-01-30 | Stop reason: SDUPTHER

## 2022-10-18 RX ORDER — SPIRONOLACTONE 50 MG/1
50 TABLET, FILM COATED ORAL DAILY
Qty: 30 TABLET | Refills: 11 | Status: SHIPPED | OUTPATIENT
Start: 2022-10-18 | End: 2023-01-17 | Stop reason: SDUPTHER

## 2022-10-18 NOTE — PROGRESS NOTES
History & Physical  Gynecology      SUBJECTIVE:     Chief Complaint: Follow-up       History of Present Illness:    27 y.o. here today for f/u of AUB and PCOS. Tried jose for 1 month, didn't like mood side effects. Now on lower dose, doing well and having regular periods. Did like that jose made her loose weight. Wants to try another dose and go from there. + acne.       Review of patient's allergies indicates:  No Known Allergies    Past Medical History:   Diagnosis Date    Allergy     Anxiety     Bipolar disorder     Schizophrenia      Past Surgical History:   Procedure Laterality Date    BLADDER SURGERY      INCONTINENCE SURGERY       OB History          0    Para        Term                AB        Living             SAB        IAB        Ectopic        Multiple        Live Births                   Family History   Problem Relation Age of Onset    Breast cancer Neg Hx     Ovarian cancer Neg Hx      Social History     Tobacco Use    Smoking status: Never    Smokeless tobacco: Never   Substance Use Topics    Alcohol use: No     Alcohol/week: 0.0 standard drinks    Drug use: No       Current Outpatient Medications   Medication Sig    busPIRone (BUSPAR) 10 MG tablet Take 1 tablet by mouth once daily.    cetirizine (ZYRTEC) 10 MG tablet Take 1 tablet (10 mg total) by mouth once daily.    dextroamphetamine-amphetamine (ADDERALL) 20 mg tablet Take 1 tablet by mouth once daily.    drospirenone-ethinyl estradioL (MICHELLE, 28,) 3-0.03 mg per tablet Take 1 tablet by mouth once daily.    escitalopram oxalate (LEXAPRO) 20 MG tablet Take 1 tablet (20 mg total) by mouth every evening.    guanFACINE (TENEX) 1 MG Tab Take 1 mg by mouth 2 (two) times daily.    guanFACINE (TENEX) 2 MG tablet     metFORMIN (GLUCOPHAGE) 1000 MG tablet Take 1 tablet (1,000 mg total) by mouth 2 (two) times daily with meals.    spironolactone (ALDACTONE) 50 MG tablet Take 1 tablet (50 mg total) by mouth once daily.    TETRAcaine HCL 0.5%  (PONTOCAINE) 0.5 % ophthalmic solution 1-2 gtts A.D. every 2-4hrs as needed for pain control (OK to use in EARS)     No current facility-administered medications for this visit.         Review of Systems:  Review of Systems   Constitutional:  Negative for chills, fatigue and fever.   HENT:  Negative for congestion.    Eyes:  Negative for visual disturbance.   Respiratory:  Negative for cough and shortness of breath.    Cardiovascular:  Negative for chest pain and palpitations.   Gastrointestinal:  Negative for abdominal distention, abdominal pain, constipation, diarrhea, nausea and vomiting.   Genitourinary:  Negative for difficulty urinating, dysuria, hematuria, vaginal bleeding and vaginal discharge.   Skin:  Negative for rash.   Neurological:  Negative for dizziness, seizures, light-headedness and headaches.   Hematological:  Does not bruise/bleed easily.   Psychiatric/Behavioral:  Negative for dysphoric mood. The patient is not nervous/anxious.       OBJECTIVE:     Physical Exam:  Physical Exam  Vitals reviewed.   Constitutional:       General: She is not in acute distress.     Appearance: Normal appearance. She is well-developed.   HENT:      Head: Normocephalic and atraumatic.   Cardiovascular:      Rate and Rhythm: Normal rate and regular rhythm.   Pulmonary:      Effort: Pulmonary effort is normal.   Abdominal:      General: There is no distension.      Palpations: Abdomen is soft.   Genitourinary:     Vagina: Normal.   Skin:     General: Skin is warm.   Neurological:      Mental Status: She is alert and oriented to person, place, and time.   Psychiatric:         Behavior: Behavior normal.         Thought Content: Thought content normal.         Judgment: Judgment normal.         ASSESSMENT:       ICD-10-CM ICD-9-CM    1. PCOS (polycystic ovarian syndrome)  E28.2 256.4           No orders of the defined types were placed in this encounter.          Plan:      - will try Angela  - if no improvement, will go  back to nordette  - added aldactone  - last CMP WNL    Merced Deluna M.D.  Obstetrics and Gynecology

## 2023-01-17 PROBLEM — E74.819 DISORDERS OF GLUCOSE TRANSPORT, UNSPECIFIED: Status: ACTIVE | Noted: 2023-01-17

## 2023-01-17 PROBLEM — R73.01 IMPAIRED FASTING GLUCOSE: Status: ACTIVE | Noted: 2023-01-17

## 2023-01-30 ENCOUNTER — TELEPHONE (OUTPATIENT)
Dept: OBSTETRICS AND GYNECOLOGY | Facility: CLINIC | Age: 28
End: 2023-01-30
Payer: MEDICARE

## 2023-01-30 ENCOUNTER — TELEPHONE (OUTPATIENT)
Dept: BARIATRICS | Facility: CLINIC | Age: 28
End: 2023-01-30
Payer: MEDICARE

## 2023-01-30 RX ORDER — DROSPIRENONE AND ETHINYL ESTRADIOL 0.03MG-3MG
1 KIT ORAL DAILY
Qty: 90 TABLET | Refills: 3 | Status: SHIPPED | OUTPATIENT
Start: 2023-01-30 | End: 2023-12-13 | Stop reason: SDUPTHER

## 2023-01-30 NOTE — TELEPHONE ENCOUNTER
Yes ma'am I saw that, but the Rx  for whatever reason on 2023.     ----- Message from Merced Deluna MD sent at 2023 10:10 AM CST -----  Contact: self  She has 3 refills   ----- Message -----  From: Vivian Maynard MA  Sent: 2023   8:58 AM CST  To: Merced Deluna MD      ----- Message -----  From: Stefani Balderas  Sent: 2023   9:54 AM CST  To: Regi KISER Staff    Type: Needs Medical Advice  Who Called: Patient   Pharmacy name and phone #:   [facility] - [facility] [address] [address]  [address] [address] [address]  Phone: [phone] Fax: [phone]  Best Call Back Number: [phone]  Additional Information: Pt states she wants to sure she gets her medicine on time and wants a new script to be called for drospirenone-ethinyl estradioL (MICHELLE, 28,) 3-0.03 mg per tablet plz send that in. Thanks

## 2023-01-30 NOTE — TELEPHONE ENCOUNTER
called pt and let her know we were unable to take her insurance as a primary insurance. Pt understands

## 2023-01-31 ENCOUNTER — TELEPHONE (OUTPATIENT)
Dept: BARIATRICS | Facility: CLINIC | Age: 28
End: 2023-01-31
Payer: MEDICARE

## 2023-01-31 NOTE — TELEPHONE ENCOUNTER
Returned call to pt. Scheduled appt with .     ----- Message from Sukhi Alexandre Patient Care Assistant sent at 1/31/2023 12:41 PM CST -----  Contact: Pt  Type:  Sooner Appointment Request    Caller is requesting a sooner appointment.  Caller declined first available appointment listed below.  Caller will not accept being placed on the waitlist and is requesting a message be sent to doctor.    Name of Caller:  Pt  When is the first available appointment?  No avail appts  Symptoms:  Weight Loss Consult  Best Call Back Number:  [phone]  Additional Information:  Pt has Medicare as primary and Medicaid as Secondary. Pt was told that she would need to have her plans updated and it has been updated to show such. Please advise.

## 2023-03-09 ENCOUNTER — PATIENT MESSAGE (OUTPATIENT)
Dept: BARIATRICS | Facility: CLINIC | Age: 28
End: 2023-03-09
Payer: MEDICARE

## 2023-04-21 ENCOUNTER — TELEPHONE (OUTPATIENT)
Dept: BARIATRICS | Facility: CLINIC | Age: 28
End: 2023-04-21
Payer: MEDICARE

## 2023-04-21 NOTE — TELEPHONE ENCOUNTER
----- Message from Veronica Vigil sent at 4/21/2023  9:45 AM CDT -----  Contact: Patient  Type:  Patient Needs Advice    Who Called:  Patient  What is this regarding?:  Pt needs to reschedule her appt from 5/26 to any day around the beginning of June so her mom can be with her.  Best Call Back Number:  [phone]  Additional Information:  Please call the patient back at the phone number listed above to advise. Thank you!

## 2023-06-15 ENCOUNTER — OFFICE VISIT (OUTPATIENT)
Dept: BARIATRICS | Facility: CLINIC | Age: 28
End: 2023-06-15
Payer: MEDICARE

## 2023-06-15 VITALS
DIASTOLIC BLOOD PRESSURE: 85 MMHG | BODY MASS INDEX: 44.65 KG/M2 | HEIGHT: 63 IN | TEMPERATURE: 97 F | RESPIRATION RATE: 16 BRPM | SYSTOLIC BLOOD PRESSURE: 142 MMHG | HEART RATE: 88 BPM | WEIGHT: 252 LBS

## 2023-06-15 DIAGNOSIS — G47.33 OBSTRUCTIVE SLEEP APNEA SYNDROME: ICD-10-CM

## 2023-06-15 DIAGNOSIS — E66.01 CLASS 3 SEVERE OBESITY DUE TO EXCESS CALORIES WITH SERIOUS COMORBIDITY AND BODY MASS INDEX (BMI) OF 40.0 TO 44.9 IN ADULT: ICD-10-CM

## 2023-06-15 DIAGNOSIS — F20.9 SCHIZOPHRENIA, UNSPECIFIED TYPE: ICD-10-CM

## 2023-06-15 DIAGNOSIS — E66.01 MORBID OBESITY: Primary | ICD-10-CM

## 2023-06-15 PROCEDURE — 3077F SYST BP >= 140 MM HG: CPT | Mod: CPTII,S$GLB,, | Performed by: SURGERY

## 2023-06-15 PROCEDURE — 99205 OFFICE O/P NEW HI 60 MIN: CPT | Mod: S$GLB,,, | Performed by: SURGERY

## 2023-06-15 PROCEDURE — 99205 PR OFFICE/OUTPT VISIT, NEW, LEVL V, 60-74 MIN: ICD-10-PCS | Mod: S$GLB,,, | Performed by: SURGERY

## 2023-06-15 PROCEDURE — 1159F PR MEDICATION LIST DOCUMENTED IN MEDICAL RECORD: ICD-10-PCS | Mod: CPTII,S$GLB,, | Performed by: SURGERY

## 2023-06-15 PROCEDURE — 3044F HG A1C LEVEL LT 7.0%: CPT | Mod: CPTII,S$GLB,, | Performed by: SURGERY

## 2023-06-15 PROCEDURE — 99999 PR PBB SHADOW E&M-EST. PATIENT-LVL IV: CPT | Mod: PBBFAC,,, | Performed by: SURGERY

## 2023-06-15 PROCEDURE — 3079F DIAST BP 80-89 MM HG: CPT | Mod: CPTII,S$GLB,, | Performed by: SURGERY

## 2023-06-15 PROCEDURE — 3008F PR BODY MASS INDEX (BMI) DOCUMENTED: ICD-10-PCS | Mod: CPTII,S$GLB,, | Performed by: SURGERY

## 2023-06-15 PROCEDURE — 3077F PR MOST RECENT SYSTOLIC BLOOD PRESSURE >= 140 MM HG: ICD-10-PCS | Mod: CPTII,S$GLB,, | Performed by: SURGERY

## 2023-06-15 PROCEDURE — 99999 PR PBB SHADOW E&M-EST. PATIENT-LVL IV: ICD-10-PCS | Mod: PBBFAC,,, | Performed by: SURGERY

## 2023-06-15 PROCEDURE — 3008F BODY MASS INDEX DOCD: CPT | Mod: CPTII,S$GLB,, | Performed by: SURGERY

## 2023-06-15 PROCEDURE — 1159F MED LIST DOCD IN RCRD: CPT | Mod: CPTII,S$GLB,, | Performed by: SURGERY

## 2023-06-15 PROCEDURE — 3079F PR MOST RECENT DIASTOLIC BLOOD PRESSURE 80-89 MM HG: ICD-10-PCS | Mod: CPTII,S$GLB,, | Performed by: SURGERY

## 2023-06-15 PROCEDURE — 3044F PR MOST RECENT HEMOGLOBIN A1C LEVEL <7.0%: ICD-10-PCS | Mod: CPTII,S$GLB,, | Performed by: SURGERY

## 2023-06-15 NOTE — PROGRESS NOTES
Initial Consult    Chief Complaint   Patient presents with    Obesity       History of Present Illness:  Patient is a 27 y.o. female who is referred for evaluation of surgical treatment of morbid obesity. Her Body mass index is 44.64 kg/m². She has known comorbidities of obstructive sleep apnea. She has not attended the bariatric seminar and is most interested in  gastric sleeve and hearing options .      Past attempts at weight loss include: Unsupervised: gym membership, slim fast;  Supervised:  diet pills from MD;  Diet pills: na;  Exercise attempts: walking or running    Weight history:   At current weight:  1 years  Obese for 9 years.  More than 35 pounds overweight for 9 years.  More than 100 pounds overweight for 8 years.  Started dieting at 25 years old.  Maximum weight reached: na  Most weight lost was 20 through good change for ?.  She describes Her eating habits as volume eater, snacker/grazer, emotional eater    JAYLIN screening: has mask and machine    Reflux screening: none    Review of patient's allergies indicates:  No Known Allergies    Current Outpatient Medications   Medication Sig Dispense Refill    busPIRone (BUSPAR) 15 MG tablet Take 15 mg by mouth 2 (two) times daily.      cetirizine (ZYRTEC) 10 MG tablet Take 1 tablet (10 mg total) by mouth once daily. 30 tablet 2    dextroamphetamine-amphetamine (ADDERALL) 20 mg tablet Take 1 tablet by mouth once daily.      drospirenone-ethinyl estradioL (MICHELLE, 28,) 3-0.03 mg per tablet Take 1 tablet by mouth once daily. 90 tablet 3    escitalopram oxalate (LEXAPRO) 20 MG tablet Take 1 tablet (20 mg total) by mouth every evening.  0    hydrOXYzine pamoate (VISTARIL) 50 MG Cap Take 50 mg by mouth every evening.      metFORMIN (GLUCOPHAGE) 1000 MG tablet Take 1 tablet (1,000 mg total) by mouth 2 (two) times daily with meals. 180 tablet 2    spironolactone (ALDACTONE) 50 MG tablet Take 1 tablet (50 mg total) by mouth once daily. 30 tablet 11    TETRAcaine HCL  "0.5% (PONTOCAINE) 0.5 % ophthalmic solution 1-2 gtts A.D. every 2-4hrs as needed for pain control (OK to use in EARS) 15 mL 1     No current facility-administered medications for this visit.       Past Medical History:   Diagnosis Date    Allergy     Anxiety     Bipolar disorder     Schizophrenia     Sleep apnea, unspecified      Past Surgical History:   Procedure Laterality Date    BLADDER SURGERY      INCONTINENCE SURGERY       Family History   Problem Relation Age of Onset    Alcohol abuse Mother     Breast cancer Neg Hx     Ovarian cancer Neg Hx      Social History     Tobacco Use    Smoking status: Never    Smokeless tobacco: Never   Substance Use Topics    Alcohol use: No     Alcohol/week: 0.0 standard drinks    Drug use: No          Review of Systems   HENT:  Positive for congestion and sinus pressure.    Respiratory:  Positive for chest tightness and shortness of breath.    Genitourinary:  Positive for menstrual problem.   Musculoskeletal:  Positive for back pain.   Neurological:  Positive for speech difficulty.   Psychiatric/Behavioral:  Positive for hallucinations. The patient is nervous/anxious.      Physical:     Vital Signs (Most Recent)  Temp: 97 °F (36.1 °C) (06/15/23 0905)  Pulse: 88 (06/15/23 0905)  Resp: 16 (06/15/23 0905)  BP: (!) 142/85 (06/15/23 0905)  5' 3" (1.6 m)  114.3 kg (251 lb 15.8 oz)     Body comp:  Fat Percent:  47.2 %  Fat Mass:  116.8 lb  FFM:  130.8 lb  TBW: 96.4 lb  TBW %:  38.9 %  BMR: 1887 kcal      Physical Exam  Vitals and nursing note reviewed.   Constitutional:       General: She is not in acute distress.     Appearance: She is well-developed. She is not diaphoretic.   HENT:      Head: Normocephalic and atraumatic.      Mouth/Throat:      Pharynx: No oropharyngeal exudate.   Eyes:      General: No scleral icterus.     Conjunctiva/sclera: Conjunctivae normal.      Pupils: Pupils are equal, round, and reactive to light.   Neck:      Thyroid: No thyromegaly.      Vascular: No " JVD.      Trachea: No tracheal deviation.   Cardiovascular:      Rate and Rhythm: Normal rate and regular rhythm.      Heart sounds: Normal heart sounds. No murmur heard.    No friction rub. No gallop.   Pulmonary:      Effort: Pulmonary effort is normal. No respiratory distress.      Breath sounds: Normal breath sounds. No stridor. No wheezing or rales.   Chest:      Chest wall: No tenderness.   Abdominal:      General: Bowel sounds are normal. There is no distension.      Palpations: Abdomen is soft. There is no mass.      Tenderness: There is no abdominal tenderness. There is no guarding or rebound.   Musculoskeletal:         General: No tenderness. Normal range of motion.      Cervical back: Normal range of motion and neck supple.   Lymphadenopathy:      Cervical: No cervical adenopathy.   Skin:     General: Skin is warm and dry.      Findings: No erythema or rash.   Neurological:      Mental Status: She is alert and oriented to person, place, and time.      Cranial Nerves: No cranial nerve deficit.   Psychiatric:         Behavior: Behavior normal.       ASSESSMENT/PLAN:        1. Morbid obesity  EKG 12-lead    Ambulatory referral/consult to Psychiatry    [place] Upper GI      2. Class 3 severe obesity due to excess calories with serious comorbidity and body mass index (BMI) of 40.0 to 44.9 in adult        3. Schizophrenia, unspecified type        4. Obstructive sleep apnea syndrome            Plan:  Tina Case has morbid obesity as their Body mass index is 44.64 kg/m². She would benefit from weight loss surgery and has chosen gastric sleeve surgery as the preferred procedure. She understands that this is a tool and lifestyle change will be necessary to keep weight off. I went over possible complications of all surgeries with the patient and she is agreeable to surgery.    She will need:    Labs  EKG  UGI   dietary consult  psych consult - dx with schizophrenia, will see what psych thinks  Seminar    I  will obtain the following clearances prior to surgery: none      We did spend some time talking about how schizophrenia is a contraindication to bariatric surgery in general.  I would like her to see our psychologist for evaluation to confirm this diagnosis.  I do think she would benefit from weight loss in general and will pursue at least dieting with her.  We will at least start the workup for surgery.    She has multiple worsening medical problems which include: obesity, ana.  We are planning to treat this with diet, exercise, and possibly elective major surgery.  She has risk factors for elective major surgery which include: ana    Diet plan: high protein low carb- mainly meats and vegetables  Exercise plan: Cardiovascular exercise, get HR over 100 for 20 minutes 3 times per week.  Start multivitamin

## 2023-06-16 ENCOUNTER — TELEPHONE (OUTPATIENT)
Dept: PSYCHIATRY | Facility: CLINIC | Age: 28
End: 2023-06-16
Payer: MEDICARE

## 2023-07-07 ENCOUNTER — CLINICAL SUPPORT (OUTPATIENT)
Dept: BARIATRICS | Facility: CLINIC | Age: 28
End: 2023-07-07
Payer: MEDICARE

## 2023-07-07 VITALS — HEIGHT: 63 IN | BODY MASS INDEX: 43.48 KG/M2 | WEIGHT: 245.38 LBS

## 2023-07-07 DIAGNOSIS — E66.01 SEVERE OBESITY (BMI >= 40): ICD-10-CM

## 2023-07-07 DIAGNOSIS — Z71.3 ENCOUNTER FOR DIETARY COUNSELING AND SURVEILLANCE: ICD-10-CM

## 2023-07-07 PROCEDURE — 97802 MEDICAL NUTRITION INDIV IN: CPT | Mod: GZ,S$GLB,,

## 2023-07-07 PROCEDURE — 99999 PR PBB SHADOW E&M-EST. PATIENT-LVL II: ICD-10-PCS | Mod: PBBFAC,,,

## 2023-07-07 PROCEDURE — 97802 PR MED NUTR THER, 1ST, INDIV, EA 15 MIN: ICD-10-PCS | Mod: GZ,S$GLB,,

## 2023-07-07 PROCEDURE — 99999 PR PBB SHADOW E&M-EST. PATIENT-LVL II: CPT | Mod: PBBFAC,,,

## 2023-07-07 NOTE — PROGRESS NOTES
NUTRITIONAL CONSULT    Referring Physician: Dr. Escalante  Reason for MNT Referral: Initial assessment for sleeve gastrectomy work-up    PAST MEDICAL HISTORY:   27 y.o. female presents with a BMI of Body mass index is 43.47 kg/m²..  Past attempts at weight loss include: Unsupervised: gym membership, slim fast;  Supervised:  diet pills from MD;  Diet pills: na;  Exercise attempts: walking or running     Weight history:   At current weight:  1 years  Obese for 9 years.  More than 35 pounds overweight for 9 years.  More than 100 pounds overweight for 8 years.  Started dieting at 25 years old.  Maximum weight reached: na  Most weight lost was 20 through good change for ?.  She describes Her eating habits as volume eater, snacker/grazer, emotional eater.    Past Medical History:   Diagnosis Date    Allergy     Anxiety     Bipolar disorder     Schizophrenia     Sleep apnea, unspecified        CLINICAL DATA:  27 y.o.-year-old White female.  Height: 5'3  Weight: 245 lbs  IBW: 115 lbs  BMI: 43.47  The patient's goal weight (50 % EBW): 180 lbs    Goal for Bariatric Surgery: to improve quality of life and to lose weight    NUTRITION & HEALTH HISTORY:  Greatest challenge: portion control, snacking at night, and emotional eating    Current diet recall:   Breakfast- Nutrition drink or 1 poached egg with 1 turkey sausage link  Lunch - Tuna roll and crabmeat 8 pieces 3d/wk; vegetables  Dinner - salmon or steak with roasted vegetables or leftovers  Bedtime snack - pickle    Current Diet:  Meal pattern: Improved since consult  Protein supplements: 0-1, Atkins or Equate brand  Snackin-2 / day; fruit, greek yogurts, string cheese, pickles  Vegetables: Likes a variety. Eats 2-3 times per week.  Fruits: Likes a variety. Eats 2-3 times per week.  Beverages:  Diet coke 3d/wk, sparkling water, and water with lemon  Dining out: x2-3, Weekly. Mostly fast food and take-out.  Cooking at home: Weekly. Mostly baked and skillet, roasting  meat,  fish, and vegetables.    Exercise:  Past exercise: None    Current exercise:  Light, walking 1 d/wk. Plans to bike ride.   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  Lives with mom.  Grocery shopping and food prep combination: mother does majority, patient provides a list.  Patient believes the household will be supportive after surgery.  Alcohol: None.  Smoking: None.    ASSESSMENT:  Patient reports attempts at weight loss, only to regain lost weight.  Patient demonstrated knowledge of healthy eating behaviors and exercise patterns; admits to not eating healthy and not exercising at this point.  Patient states willingness to change lifestyle and make behavior modifications.  Expect fair  compliance after surgery at this time.  Patient reports struggling with food addiction. Known triggers include coffee and pasta. Discussion of creating consistency in routine to decrease likelihood of grabbing known trigger foods. Created schedule with patient to incorporate 5-6 small meals/day. Meal times included: 9am, 11am, 1pm, 3pm, 5pm, 7pm. Review of portion control measures to ensure not over consuming at meals. RD encouraged divided plates, smaller utensils, and timer at meals to aid in portion control.  RD would like to see the patient become more independent in meal preparation as she is relying heavily on her mother for grocery shopping and food preparation. Patient will need to demonstrate the ability to make meals with appropriate food items to ensure success with bariatric surgery.    Insurance requires medically supervised diet prior to consideration for bariatric surgery.    BARIATRIC DIET DISCUSSION:  Discussed diet after surgery and related to patients food record.  Reviewed diet progression before and after surgery.  Reinforced that  surgery is not a magic bullet and importance of low fat foods and no snacking.  Stressed importance of exercise and its role in achieving weight loss goals.  Answered all questions.    RECOMMENDATIONS:  Patient is a potential candidate for bariatric surgery.    Needs additional visit(s) with RD.    PLAN:  Resume work-up for surgery.  Continue to review Bariatric Nutrition Guidebook at home and call with any questions.  Work on Bariatric Nutrition Checklist.  5-6 meals per day.  Reduce frequency of dining out.  More grocery shopping and meal preparation at home.  Increase exercise to 3 d/wk for 30 minutes by 10 minute increments x3/d or 15 minute increments x2/d.  Start shopping for bariatric vitamins & minerals.  Return to clinic.  Begin decreasing diet coke consuming to 1 can/day and removing sparkling water.     SESSION TIME:  60 minutes

## 2023-07-12 ENCOUNTER — HOSPITAL ENCOUNTER (OUTPATIENT)
Dept: RADIOLOGY | Facility: HOSPITAL | Age: 28
Discharge: HOME | End: 2023-07-12
Attending: SURGERY
Payer: MEDICARE

## 2023-07-12 DIAGNOSIS — E66.01 MORBID OBESITY: ICD-10-CM

## 2023-07-12 PROCEDURE — A9698 NON-RAD CONTRAST MATERIALNOC: HCPCS | Mod: PO | Performed by: SURGERY

## 2023-07-12 PROCEDURE — 25500020 PHARM REV CODE 255: Mod: PO | Performed by: SURGERY

## 2023-07-12 PROCEDURE — 74240 X-RAY XM UPR GI TRC 1CNTRST: CPT | Mod: 26,,, | Performed by: RADIOLOGY

## 2023-07-12 PROCEDURE — 74240 FL UPPER GI: ICD-10-PCS | Mod: 26,,, | Performed by: RADIOLOGY

## 2023-07-12 PROCEDURE — 74240 X-RAY XM UPR GI TRC 1CNTRST: CPT | Mod: TC,FY,PO

## 2023-07-12 RX ADMIN — BARIUM SULFATE 355 ML: 0.6 SUSPENSION ORAL at 10:07

## 2023-07-12 RX ADMIN — BARIUM SULFATE 340 ML: 980 POWDER, FOR SUSPENSION ORAL at 10:07

## 2023-07-19 ENCOUNTER — OFFICE VISIT (OUTPATIENT)
Dept: PSYCHIATRY | Facility: CLINIC | Age: 28
End: 2023-07-19
Payer: MEDICARE

## 2023-07-19 DIAGNOSIS — Z01.818 PREOPERATIVE EVALUATION TO RULE OUT SURGICAL CONTRAINDICATION: Primary | ICD-10-CM

## 2023-07-19 DIAGNOSIS — E66.01 CLASS 3 SEVERE OBESITY DUE TO EXCESS CALORIES WITH SERIOUS COMORBIDITY AND BODY MASS INDEX (BMI) OF 40.0 TO 44.9 IN ADULT: ICD-10-CM

## 2023-07-19 DIAGNOSIS — G47.33 OBSTRUCTIVE SLEEP APNEA SYNDROME: ICD-10-CM

## 2023-07-19 DIAGNOSIS — F33.41 RECURRENT MAJOR DEPRESSIVE DISORDER, IN PARTIAL REMISSION: ICD-10-CM

## 2023-07-19 DIAGNOSIS — F20.9 SCHIZOPHRENIA, UNSPECIFIED TYPE: ICD-10-CM

## 2023-07-19 PROCEDURE — 96138 PR PSYCH/NEUROPSYCH TEST ADMIN/SCORING, BY TECH, 2+ TESTS, 1ST 30 MIN: ICD-10-PCS | Mod: 95,,, | Performed by: PSYCHOLOGIST

## 2023-07-19 PROCEDURE — 96139 PR PSYCH/NEUROPSYCH TEST ADMIN/SCORING, BY TECH, 2+ TESTS, EA ADDTL 30 MIN: ICD-10-PCS | Mod: 95,,, | Performed by: PSYCHOLOGIST

## 2023-07-19 PROCEDURE — 96130 PR PSYCHOLOGIC TEST EVAL SVCS, 1ST HR: ICD-10-PCS | Mod: 95,,, | Performed by: PSYCHOLOGIST

## 2023-07-19 PROCEDURE — 96131 PR PSYCHOLOGIC TEST EVAL SVCS, EA ADDTL HR: ICD-10-PCS | Mod: 95,,, | Performed by: PSYCHOLOGIST

## 2023-07-19 PROCEDURE — 3044F HG A1C LEVEL LT 7.0%: CPT | Mod: CPTII,95,, | Performed by: PSYCHOLOGIST

## 2023-07-19 PROCEDURE — 90791 PSYCH DIAGNOSTIC EVALUATION: CPT | Mod: 95,,, | Performed by: PSYCHOLOGIST

## 2023-07-19 PROCEDURE — 96139 PSYCL/NRPSYC TST TECH EA: CPT | Mod: 95,,, | Performed by: PSYCHOLOGIST

## 2023-07-19 PROCEDURE — 96131 PSYCL TST EVAL PHYS/QHP EA: CPT | Mod: 95,,, | Performed by: PSYCHOLOGIST

## 2023-07-19 PROCEDURE — 90791 PR PSYCHIATRIC DIAGNOSTIC EVALUATION: ICD-10-PCS | Mod: 95,,, | Performed by: PSYCHOLOGIST

## 2023-07-19 PROCEDURE — 96138 PSYCL/NRPSYC TECH 1ST: CPT | Mod: 95,,, | Performed by: PSYCHOLOGIST

## 2023-07-19 PROCEDURE — 3044F PR MOST RECENT HEMOGLOBIN A1C LEVEL <7.0%: ICD-10-PCS | Mod: CPTII,95,, | Performed by: PSYCHOLOGIST

## 2023-07-19 PROCEDURE — 1159F MED LIST DOCD IN RCRD: CPT | Mod: CPTII,95,, | Performed by: PSYCHOLOGIST

## 2023-07-19 PROCEDURE — 1159F PR MEDICATION LIST DOCUMENTED IN MEDICAL RECORD: ICD-10-PCS | Mod: CPTII,95,, | Performed by: PSYCHOLOGIST

## 2023-07-19 PROCEDURE — 96130 PSYCL TST EVAL PHYS/QHP 1ST: CPT | Mod: 95,,, | Performed by: PSYCHOLOGIST

## 2023-07-19 NOTE — Clinical Note
As I shared earlier in my message... Given the complexity of Ms. Case' psychological profile, a consultation with her medication provider is necessary prior to giving clearance for bariatric surgery. Following consultation with her provider, this report will be amended to include this perspective. Furthermore, her recent history of binge eating is a significant risk factor for adverse outcome to bariatric surgery. She agreed to f/u in 3 months to reassess for binge eating remission.  Let me know if you have questions, Dr. Escalante!

## 2023-07-25 ENCOUNTER — TELEPHONE (OUTPATIENT)
Dept: PSYCHIATRY | Facility: CLINIC | Age: 28
End: 2023-07-25
Payer: MEDICARE
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
## 2023-07-26 NOTE — PSYCH TESTING
[facility] [address]  [place] [address]  [phone]    REPORT OF PSYCHOLOGICAL TESTING    NAME: Tina Case  MRN: [id number]  : 1995    REFERRED BY: Steven Escalante MD    REASON FOR REFERRAL:  Psychological Evaluation prior to bariatric surgery.    EVALUATED BY:  Jeffrey Rodriguez, Ph.D., Clinical Psychologist  OMID Sharif, Psychometrician    DATES OF EVALUATION: 2023 and 2023    EVALUATION PROCEDURES AND TIMES:  Conducted by Psychologist (2 hours):  Integration of patient data, interpretation of standardized test results and clinical data, clinical decision-making, treatment planning and report, and interactive feedback to the patient  CPT Codes:  88962 - 1 hour; 52076 - 1 hour  Conducted by Technician (1.5 hours):  Psychological test administration and scoring by technician, two or more tests, any method:  [place] Multiphasic Personality Inventory - 2 - Restructured Form (MMPI-2-RF); [facility] Behavioral Medicine Diagnostic (MBMD)  CPT Codes:  13402 - 30 minutes; 76656 - 30 minutes, 49286 - 30 minutes, 34793 - 30 minutes, 44567 - 30 minutes    EVALUATION FINDINGS:  Before this evaluation was initiated, the purposes and process of the assessment and the limits of confidentiality were discussed with the patient who expressed understanding of these issues and orally consented to proceed with the evaluation.    Ms. Case has struggled with weight since when college started I would grab a quick bite and struggled to stay full. I ate too much of a portion.  Factors that have contributed to her weight gain over the years include: laziness; sedentary hobbies (i.e., drawing); large portions; eating late at night. Pt stated she is trying to take breaks between drawings to walk. In addition, Ms. Case acknowledges that she is an emotional eater, with sadness and boredom as her primary emotional triggers to overeat.  She is working on increasing her coping  mechanisms for stress, including walking, finding other hobbies, doing something to distract myself.  She has tried many weight loss methods on her own (i.e., walk a mile 3x/week; eat small portions; drink more water; watch calories; eat slow; not eating late at night) with little success, and she believes that her biggest weight loss challenge is losing more weight after initial weight loss of 10 lbs.  Her motivation for seeking surgery now is to feel energized, and she is tired of being addicted to food I'm tired of feeling hungry and always searching for food in the kitchen. She added that she is diagnosed with PCOS, and hopes that weight loss will help balance her hormones and start a regular cycle.    She reported three suicide attempts in her teenage years and diagnosis of schizophrenia since age 10. She denied suicide attempt or ideation in over 10 years and denied functional impairment from auditory hallucinations since teenage years. Her psychosis and suicidal ideation have been well managed via medication and therapy. She denied command hallucinations, reporting that the AH typically consists of sounds of a crowd of people and occasionally a negative, critical voice. She reports history of binge eating disorder, which she has reportedly discontinued since starting the bariatric program.    At her initial consultation with Dr. Escalante, her BMI was 44.64 kg/m². Her medical comorbidities include: JAYLIN. She has met multiple times with a bariatric dietician, and reports that she has made changes to her eating habits. She was aware of details regarding the Sleeve procedure, as well as risks of the procedure and post-operative eating restrictions. She appears motivated for change at this time. She was fully cooperative and engaged in the assessment process.      MMPI-2 RF.  The MMPI-2 RF provides an assessment of personality and psychopathology with specific evaluation of psychosocial risk factors associated  "with outcomes of bariatric surgery  Ms. Case produced an interpretable MMPI-2 RF profile despite evidence of potential over-reporting (specifically of memory complaints).  This type of responding may occur in individuals with substantial problems who report credible symptoms; however, in the absence of corroborating information it may reflect exaggeration or an attempt to portray great distress.  This profile should be interpreted with some caution due to these issues.      TEST RESULTS.  Ms. Case reports cognitive, emotional, thought, and interpersonal dysfunction.  There is no evidence of behavioral issues in her profile.  She reports a below average level of aggression.  Cognitive.   She reports diffuse pattern of cognitive difficulties and complains of memory and concentration problems, as well as difficulty managing stress.  Thought.    She reports persecutory ideation, such as believing others seek to harm her. In discussing this result, she denied paranoia about others harming her, but she did report she has her "guard up" around others.  Emotional.  She reports a history of suicidal ideation and attempts. She also reports significant and pervasive emotional distress, feeling sad, and feeling dissatisfied with her life. She also reports lacking confidence and is likely to ruminate and feel inferior. She also reports difficulty coping with current difficulties on her own. Additionally, she reports lacking positive emotional experiences and reports pessimism and anhedonia. She is likely to be self-critical, guilt-prone and reports difficulties related to anxiety and anger.  Interpersonal.  She reports not enjoying social events and avoiding social situations. She likely struggles to form and maintain relationships as a result.    Depression and suicidal history, as evidenced by this profile, are risk factors for adverse bariatric surgery outcomes. These risks, as well as ways of mitigating these risks, " were discussed during feedback portion of interview.    MBMB. The MBMD is a multidimensional assessment designed to identify psychosocial factors that may support or interfere with a patient's course of medical treatment. The categorizations below are based on credible probabilistic judgments and should not be considered definitive.    Negative Health Habits.  The following negative health habits are likely problem areas: Eating, Inactivity, and Caffeine.      Psychiatric Indications.  Pt's profile indicates possible anxiety-tension and emotional lability. Ms. Case may exhibit significant emotional dysfunction in response to medical stressors.    Coping Styles.  Pt may feel comfortable looking to others for leadership and engage in submissive behaviors. She may struggle to take initiative of reporting medical or physical problems. Her healthcare team should vigorously seek out all relevant medical information in sensitive but thorough manner. Scheduling frequent follow-up appointments can prevent Ms. Case' passivity about her healthcare.    Stress Moderators.  Pt's profile indicates no reported liabilities to her functioning and functional competence, pain tolerance, future optimism, and spiritual robby as assets to her functioning. Despite Ms. Case' functional limitations, she has a strong desire to maintain her independence and routines. She typically feels confident about medical treatment and her ability to recover. Her strong spiritual robby is also an asset to help cope with stress of medical operation and ailment.    Treatment Prognostics. Pt's profile indicates possible interventional fragility as a liability and medication conscientiousness, information receptivity, and optimal compliance as assets.  Her ability to withstand stressful medical procedures may be compromised by her overall emotional dysfunction and anxiety-proneness. Her treatment team should be vigilant for anxiety and stress and  intervene to soothe her in times of stress. Furthermore, her medical team should collaborate with her psychiatric providers before and after stressful interventions. She is open to receiving medical information, and her treatment team can alleviate some of her anxiety by providing helpful information. Furthermore, she exhibits compliance with medical regimen, and her treatment plan should be clearly outlined for her.    MBMD POST-SURGERY PROBABILITIES    Patient Behavior.  Ms. Case' profile indicates high likelihood that she will follow nutritional advice and comply with a medical regimen; and low likelihood that she will change her unhealthy habits, refrain from engaging in unhealthy eating behavior, maintain an exercise program, maintain her postsurgical weight loss, and avoid long-term health complications.     Postsurgical [place].  Ms. Dyer profile indicates average likelihood that surgery will improve her body image. Her profile indicates low likelihood that surgery will improve her overall quality of life, psychosocial functioning, physical health, mental outlook, sexual activity, and employment/vocational opportunities.     Psychological factors revealed by MBMD profile may contribute to complications for this patient. Possible complications and ways mitigating these risks were discussed in feedback session.       DIAGNOSTIC IMPRESSIONS:  Z01.818 Pre-operative examination   E66.01   Morbid obesity  G47.33   Obstructive Sleep Apnea  F20.9     Schizophrenia  F33.41   Major depressive disorder, in partial remission  Z68.xx    Body Mass Index of 43.47    SUMMARY: Ms. Case has a long history of weight problems and is pursuing bariatric surgery at this time in an effort to improve her health and quality of life. Results of personality testing should be considered valid, and they indicate history of depression and suicidal behavior. Depression, history of suicide attempts, and diagnosis of schizophrenia  "were also indicated in the interview. Ms. Case denied serious SI or functional impairment from auditory hallucinations in the last 10 years due to active participation in medication management and psychotherapy. In communication with her psychiatrist of over 15 years, Dr. Carvajal confirmed that Ms. Case is "very compliant" to treatment plan, medication use, and attending appointments. He also confirmed that she is active in psychotherapy and has observed remission of suicidal ideation of over ten years and lack of functional impairment by auditory hallucinations in over ten years. Dr. Carvajal denied having significant concerns about her ability to benefit from bariatric surgery. Her recent history of binge eating is a significant risk factor for adverse outcome to bariatric surgery, however. She agreed to f/u in 3 months to reassess for binge eating remission.      "

## 2023-07-27 ENCOUNTER — DOCUMENTATION ONLY (OUTPATIENT)
Dept: PSYCHIATRY | Facility: CLINIC | Age: 28
End: 2023-07-27
Payer: MEDICARE

## 2023-07-27 NOTE — PROGRESS NOTES
Contacted [facility] to discuss Dr. Carvajal's treatment of Ms. Case, as part of bariatric presurgery psych eval.  took the undersigned's message for Dr. Carvajal, requesting return call.

## 2023-08-30 ENCOUNTER — PATIENT MESSAGE (OUTPATIENT)
Dept: BARIATRICS | Facility: CLINIC | Age: 28
End: 2023-08-30
Payer: MEDICARE

## 2023-09-22 ENCOUNTER — OFFICE VISIT (OUTPATIENT)
Dept: BARIATRICS | Facility: CLINIC | Age: 28
End: 2023-09-22
Payer: MEDICARE

## 2023-09-22 VITALS
HEART RATE: 102 BPM | DIASTOLIC BLOOD PRESSURE: 82 MMHG | BODY MASS INDEX: 41.67 KG/M2 | RESPIRATION RATE: 16 BRPM | TEMPERATURE: 97 F | SYSTOLIC BLOOD PRESSURE: 142 MMHG | WEIGHT: 235.19 LBS | HEIGHT: 63 IN

## 2023-09-22 DIAGNOSIS — E66.01 MORBID OBESITY: Primary | ICD-10-CM

## 2023-09-22 DIAGNOSIS — G47.33 OBSTRUCTIVE SLEEP APNEA SYNDROME: ICD-10-CM

## 2023-09-22 PROCEDURE — 3079F DIAST BP 80-89 MM HG: CPT | Mod: CPTII,S$GLB,, | Performed by: SURGERY

## 2023-09-22 PROCEDURE — 99999 PR PBB SHADOW E&M-EST. PATIENT-LVL III: CPT | Mod: PBBFAC,,, | Performed by: SURGERY

## 2023-09-22 PROCEDURE — 3008F BODY MASS INDEX DOCD: CPT | Mod: CPTII,S$GLB,, | Performed by: SURGERY

## 2023-09-22 PROCEDURE — 3077F PR MOST RECENT SYSTOLIC BLOOD PRESSURE >= 140 MM HG: ICD-10-PCS | Mod: CPTII,S$GLB,, | Performed by: SURGERY

## 2023-09-22 PROCEDURE — 1159F MED LIST DOCD IN RCRD: CPT | Mod: CPTII,S$GLB,, | Performed by: SURGERY

## 2023-09-22 PROCEDURE — 3044F HG A1C LEVEL LT 7.0%: CPT | Mod: CPTII,S$GLB,, | Performed by: SURGERY

## 2023-09-22 PROCEDURE — 3079F PR MOST RECENT DIASTOLIC BLOOD PRESSURE 80-89 MM HG: ICD-10-PCS | Mod: CPTII,S$GLB,, | Performed by: SURGERY

## 2023-09-22 PROCEDURE — 99213 OFFICE O/P EST LOW 20 MIN: CPT | Mod: S$GLB,,, | Performed by: SURGERY

## 2023-09-22 PROCEDURE — 3044F PR MOST RECENT HEMOGLOBIN A1C LEVEL <7.0%: ICD-10-PCS | Mod: CPTII,S$GLB,, | Performed by: SURGERY

## 2023-09-22 PROCEDURE — 99999 PR PBB SHADOW E&M-EST. PATIENT-LVL III: ICD-10-PCS | Mod: PBBFAC,,, | Performed by: SURGERY

## 2023-09-22 PROCEDURE — 99213 PR OFFICE/OUTPT VISIT, EST, LEVL III, 20-29 MIN: ICD-10-PCS | Mod: S$GLB,,, | Performed by: SURGERY

## 2023-09-22 PROCEDURE — 1159F PR MEDICATION LIST DOCUMENTED IN MEDICAL RECORD: ICD-10-PCS | Mod: CPTII,S$GLB,, | Performed by: SURGERY

## 2023-09-22 PROCEDURE — 3008F PR BODY MASS INDEX (BMI) DOCUMENTED: ICD-10-PCS | Mod: CPTII,S$GLB,, | Performed by: SURGERY

## 2023-09-22 PROCEDURE — 3077F SYST BP >= 140 MM HG: CPT | Mod: CPTII,S$GLB,, | Performed by: SURGERY

## 2023-09-22 NOTE — PROGRESS NOTES
Medically Supervised Weight Loss Documentation    Date of Visit: 09/22/2023    Patient: Tina Case    Current Weight: 235  Current BMI: Body mass index is 41.66 kg/m².  Weight Change: - 16 pounds    Last Weight: 251    Beginning Weight: 251      Vitals:   Vitals:    09/22/23 1418   BP: (!) 142/82   Pulse: 102   Resp: 16   Temp: 97.3 °F (36.3 °C)       Comorbidities:   Past Medical History:   Diagnosis Date    Allergy     Anxiety     Bipolar disorder     Schizophrenia     Sleep apnea, unspecified        Medications:  Current Outpatient Medications on File Prior to Visit   Medication Sig Dispense Refill    busPIRone (BUSPAR) 15 MG tablet Take 15 mg by mouth 2 (two) times daily.      cetirizine (ZYRTEC) 10 MG tablet Take 1 tablet (10 mg total) by mouth once daily. 30 tablet 2    dextroamphetamine-amphetamine (ADDERALL) 20 mg tablet Take 1 tablet by mouth once daily.      drospirenone-ethinyl estradioL (MICHELLE, 28,) 3-0.03 mg per tablet Take 1 tablet by mouth once daily. 90 tablet 3    escitalopram oxalate (LEXAPRO) 20 MG tablet Take 1 tablet (20 mg total) by mouth every evening.  0    hydrOXYzine pamoate (VISTARIL) 50 MG Cap Take 50 mg by mouth every evening.      metFORMIN (GLUCOPHAGE) 1000 MG tablet Take 1 tablet (1,000 mg total) by mouth 2 (two) times daily with meals. 180 tablet 2    spironolactone (ALDACTONE) 50 MG tablet Take 1 tablet (50 mg total) by mouth once daily. 30 tablet 11    TETRAcaine HCL 0.5% (PONTOCAINE) 0.5 % ophthalmic solution 1-2 gtts A.D. every 2-4hrs as needed for pain control (OK to use in EARS) 15 mL 1     No current facility-administered medications on file prior to visit.         Body comp:  Fat Percent:  45.9 %  Fat Mass:  108 lb  FFM:  127.2 lb  TBW: 93.4 lb  TBW %:  39.7 %  BMR: 1824 kcal      Diet Education Discussed:    Breakfast:  fruit cup  Lunch:  chicken breast  Dinner:  steak and vegetables  Stopped sodas    Exercise/Activity Discussed:    Walking 2 miles daily      Behavior or Diet Goals for this patient:    Continue diet plan  Exercise as much as possible  Doing very well       Labs  EKG  UGI -  Normal double-contrast upper GI study.  dietary consult- done  psych consult - dx with schizophrenia, will see what psych thinks- re rene in 3 months  Seminar     I will obtain the following clearances prior to surgery: none       : total time spent for visit: 20 minutes

## 2023-10-06 ENCOUNTER — TELEPHONE (OUTPATIENT)
Dept: PSYCHIATRY | Facility: CLINIC | Age: 28
End: 2023-10-06
Payer: MEDICARE

## 2023-10-19 ENCOUNTER — OFFICE VISIT (OUTPATIENT)
Dept: BARIATRICS | Facility: CLINIC | Age: 28
End: 2023-10-19
Payer: MEDICARE

## 2023-10-19 VITALS
RESPIRATION RATE: 16 BRPM | SYSTOLIC BLOOD PRESSURE: 144 MMHG | HEART RATE: 102 BPM | DIASTOLIC BLOOD PRESSURE: 85 MMHG | WEIGHT: 237.19 LBS | HEIGHT: 63 IN | TEMPERATURE: 97 F | BODY MASS INDEX: 42.03 KG/M2

## 2023-10-19 DIAGNOSIS — K76.0 STEATOSIS OF LIVER: ICD-10-CM

## 2023-10-19 DIAGNOSIS — E66.01 MORBID OBESITY: Primary | ICD-10-CM

## 2023-10-19 DIAGNOSIS — G47.33 OBSTRUCTIVE SLEEP APNEA SYNDROME: ICD-10-CM

## 2023-10-19 PROCEDURE — 99999 PR PBB SHADOW E&M-EST. PATIENT-LVL III: ICD-10-PCS | Mod: PBBFAC,,, | Performed by: SURGERY

## 2023-10-19 PROCEDURE — 3079F PR MOST RECENT DIASTOLIC BLOOD PRESSURE 80-89 MM HG: ICD-10-PCS | Mod: CPTII,S$GLB,, | Performed by: SURGERY

## 2023-10-19 PROCEDURE — 3008F BODY MASS INDEX DOCD: CPT | Mod: CPTII,S$GLB,, | Performed by: SURGERY

## 2023-10-19 PROCEDURE — 3044F PR MOST RECENT HEMOGLOBIN A1C LEVEL <7.0%: ICD-10-PCS | Mod: CPTII,S$GLB,, | Performed by: SURGERY

## 2023-10-19 PROCEDURE — 3077F PR MOST RECENT SYSTOLIC BLOOD PRESSURE >= 140 MM HG: ICD-10-PCS | Mod: CPTII,S$GLB,, | Performed by: SURGERY

## 2023-10-19 PROCEDURE — 99213 PR OFFICE/OUTPT VISIT, EST, LEVL III, 20-29 MIN: ICD-10-PCS | Mod: S$GLB,,, | Performed by: SURGERY

## 2023-10-19 PROCEDURE — 3008F PR BODY MASS INDEX (BMI) DOCUMENTED: ICD-10-PCS | Mod: CPTII,S$GLB,, | Performed by: SURGERY

## 2023-10-19 PROCEDURE — 3079F DIAST BP 80-89 MM HG: CPT | Mod: CPTII,S$GLB,, | Performed by: SURGERY

## 2023-10-19 PROCEDURE — 99999 PR PBB SHADOW E&M-EST. PATIENT-LVL III: CPT | Mod: PBBFAC,,, | Performed by: SURGERY

## 2023-10-19 PROCEDURE — 99213 OFFICE O/P EST LOW 20 MIN: CPT | Mod: S$GLB,,, | Performed by: SURGERY

## 2023-10-19 PROCEDURE — 1159F MED LIST DOCD IN RCRD: CPT | Mod: CPTII,S$GLB,, | Performed by: SURGERY

## 2023-10-19 PROCEDURE — 3077F SYST BP >= 140 MM HG: CPT | Mod: CPTII,S$GLB,, | Performed by: SURGERY

## 2023-10-19 PROCEDURE — 3044F HG A1C LEVEL LT 7.0%: CPT | Mod: CPTII,S$GLB,, | Performed by: SURGERY

## 2023-10-19 PROCEDURE — 1159F PR MEDICATION LIST DOCUMENTED IN MEDICAL RECORD: ICD-10-PCS | Mod: CPTII,S$GLB,, | Performed by: SURGERY

## 2023-10-19 NOTE — PROGRESS NOTES
Medically Supervised Weight Loss Documentation    Date of Visit: 10/19/2023    Patient: Tina Case    Current Weight: 237  Current BMI: Body mass index is 42.02 kg/m².  Weight Change: - 14 pounds    Last Weight: 235    Beginning Weight: 251      Vitals:   Vitals:    10/19/23 1006   BP: (!) 144/85   Pulse: 102   Resp: 16   Temp: 97.4 °F (36.3 °C)       Comorbidities:   Past Medical History:   Diagnosis Date    Allergy     Anxiety     Bipolar disorder     Schizophrenia     Sleep apnea, unspecified        Medications:  Current Outpatient Medications on File Prior to Visit   Medication Sig Dispense Refill    busPIRone (BUSPAR) 15 MG tablet Take 15 mg by mouth 2 (two) times daily.      cetirizine (ZYRTEC) 10 MG tablet Take 1 tablet (10 mg total) by mouth once daily. 30 tablet 2    dextroamphetamine-amphetamine (ADDERALL) 20 mg tablet Take 1 tablet by mouth once daily.      drospirenone-ethinyl estradioL (MICHELLE, 28,) 3-0.03 mg per tablet Take 1 tablet by mouth once daily. 90 tablet 3    escitalopram oxalate (LEXAPRO) 20 MG tablet Take 1 tablet (20 mg total) by mouth every evening.  0    hydrOXYzine pamoate (VISTARIL) 50 MG Cap Take 50 mg by mouth every evening.      metFORMIN (GLUCOPHAGE) 1000 MG tablet Take 1 tablet (1,000 mg total) by mouth 2 (two) times daily with meals. 180 tablet 2    spironolactone (ALDACTONE) 50 MG tablet Take 1 tablet (50 mg total) by mouth once daily. 30 tablet 11    TETRAcaine HCL 0.5% (PONTOCAINE) 0.5 % ophthalmic solution 1-2 gtts A.D. every 2-4hrs as needed for pain control (OK to use in EARS) 15 mL 1     No current facility-administered medications on file prior to visit.         Body comp:  Fat Percent:  49 %  Fat Mass:  116.2 lb  FFM:  121 lb  TBW: 89 lb  TBW %:  37.5 %  BMR: 1760 kcal      Diet Education Discussed:    Fell off diet plan from stress recently but got back quickly    Breakfast:  yogurt/protein shake, vine of grapes  Lunch:  protein shake  Dinner:  chicken beef  fish - mostly protein, vegetable on the side    Exercise/Activity Discussed:    None     Behavior or Diet Goals for this patient:    Doing great, fell off plan then got back on quickly  Get back into routine at least e times per week     Labs  EKG  UGI -  Normal double-contrast upper GI study.  dietary consult- done  psych consult - dx with schizophrenia, will see what psych thinks- re rene in 3 months  Seminar     I will obtain the following clearances prior to surgery: none       : total time spent for visit: 20 minutes

## 2023-10-23 ENCOUNTER — TELEPHONE (OUTPATIENT)
Dept: PSYCHIATRY | Facility: CLINIC | Age: 28
End: 2023-10-23
Payer: MEDICARE

## 2023-10-25 ENCOUNTER — TELEPHONE (OUTPATIENT)
Dept: PSYCHIATRY | Facility: CLINIC | Age: 28
End: 2023-10-25
Payer: MEDICARE

## 2023-11-10 ENCOUNTER — OFFICE VISIT (OUTPATIENT)
Dept: BARIATRICS | Facility: CLINIC | Age: 28
End: 2023-11-10
Payer: MEDICARE

## 2023-11-10 ENCOUNTER — PATIENT MESSAGE (OUTPATIENT)
Dept: BARIATRICS | Facility: CLINIC | Age: 28
End: 2023-11-10

## 2023-11-10 ENCOUNTER — CLINICAL SUPPORT (OUTPATIENT)
Dept: BARIATRICS | Facility: CLINIC | Age: 28
End: 2023-11-10
Payer: MEDICARE

## 2023-11-10 VITALS
HEIGHT: 63 IN | DIASTOLIC BLOOD PRESSURE: 73 MMHG | HEART RATE: 91 BPM | RESPIRATION RATE: 16 BRPM | WEIGHT: 237.19 LBS | TEMPERATURE: 98 F | SYSTOLIC BLOOD PRESSURE: 122 MMHG | BODY MASS INDEX: 42.03 KG/M2

## 2023-11-10 DIAGNOSIS — Z13.21 ENCOUNTER FOR VITAMIN DEFICIENCY SCREENING: ICD-10-CM

## 2023-11-10 DIAGNOSIS — E66.01 MORBID OBESITY: Primary | ICD-10-CM

## 2023-11-10 DIAGNOSIS — E55.9 VITAMIN D DEFICIENCY: ICD-10-CM

## 2023-11-10 DIAGNOSIS — G47.33 OBSTRUCTIVE SLEEP APNEA SYNDROME: ICD-10-CM

## 2023-11-10 DIAGNOSIS — E66.01 SEVERE OBESITY (BMI >= 40): ICD-10-CM

## 2023-11-10 DIAGNOSIS — D53.9 NUTRITIONAL ANEMIA, UNSPECIFIED: ICD-10-CM

## 2023-11-10 DIAGNOSIS — E44.1 MILD PROTEIN-CALORIE MALNUTRITION: ICD-10-CM

## 2023-11-10 DIAGNOSIS — Z71.3 ENCOUNTER FOR DIETARY COUNSELING AND SURVEILLANCE: ICD-10-CM

## 2023-11-10 DIAGNOSIS — E83.10 DISORDER OF IRON METABOLISM, UNSPECIFIED: ICD-10-CM

## 2023-11-10 DIAGNOSIS — F20.9 SCHIZOPHRENIA, UNSPECIFIED TYPE: ICD-10-CM

## 2023-11-10 PROCEDURE — 3074F SYST BP LT 130 MM HG: CPT | Mod: CPTII,S$GLB,, | Performed by: NURSE PRACTITIONER

## 2023-11-10 PROCEDURE — 3008F PR BODY MASS INDEX (BMI) DOCUMENTED: ICD-10-PCS | Mod: CPTII,S$GLB,, | Performed by: NURSE PRACTITIONER

## 2023-11-10 PROCEDURE — 1160F RVW MEDS BY RX/DR IN RCRD: CPT | Mod: CPTII,S$GLB,, | Performed by: NURSE PRACTITIONER

## 2023-11-10 PROCEDURE — 3074F PR MOST RECENT SYSTOLIC BLOOD PRESSURE < 130 MM HG: ICD-10-PCS | Mod: CPTII,S$GLB,, | Performed by: NURSE PRACTITIONER

## 2023-11-10 PROCEDURE — 3078F PR MOST RECENT DIASTOLIC BLOOD PRESSURE < 80 MM HG: ICD-10-PCS | Mod: CPTII,S$GLB,, | Performed by: NURSE PRACTITIONER

## 2023-11-10 PROCEDURE — 3044F HG A1C LEVEL LT 7.0%: CPT | Mod: CPTII,S$GLB,, | Performed by: NURSE PRACTITIONER

## 2023-11-10 PROCEDURE — 99204 OFFICE O/P NEW MOD 45 MIN: CPT | Mod: S$GLB,,, | Performed by: NURSE PRACTITIONER

## 2023-11-10 PROCEDURE — 3044F PR MOST RECENT HEMOGLOBIN A1C LEVEL <7.0%: ICD-10-PCS | Mod: CPTII,S$GLB,, | Performed by: NURSE PRACTITIONER

## 2023-11-10 PROCEDURE — 99999 PR PBB SHADOW E&M-EST. PATIENT-LVL IV: CPT | Mod: PBBFAC,,, | Performed by: NURSE PRACTITIONER

## 2023-11-10 PROCEDURE — 3008F BODY MASS INDEX DOCD: CPT | Mod: CPTII,S$GLB,, | Performed by: NURSE PRACTITIONER

## 2023-11-10 PROCEDURE — 3078F DIAST BP <80 MM HG: CPT | Mod: CPTII,S$GLB,, | Performed by: NURSE PRACTITIONER

## 2023-11-10 PROCEDURE — 1159F PR MEDICATION LIST DOCUMENTED IN MEDICAL RECORD: ICD-10-PCS | Mod: CPTII,S$GLB,, | Performed by: NURSE PRACTITIONER

## 2023-11-10 PROCEDURE — 1159F MED LIST DOCD IN RCRD: CPT | Mod: CPTII,S$GLB,, | Performed by: NURSE PRACTITIONER

## 2023-11-10 PROCEDURE — 99999 PR PBB SHADOW E&M-EST. PATIENT-LVL IV: ICD-10-PCS | Mod: PBBFAC,,, | Performed by: NURSE PRACTITIONER

## 2023-11-10 PROCEDURE — 1160F PR REVIEW ALL MEDS BY PRESCRIBER/CLIN PHARMACIST DOCUMENTED: ICD-10-PCS | Mod: CPTII,S$GLB,, | Performed by: NURSE PRACTITIONER

## 2023-11-10 PROCEDURE — 97803 PR MED NUTR THER, SUBSQ, INDIV, EA 15 MIN: ICD-10-PCS | Mod: GZ,S$GLB,,

## 2023-11-10 PROCEDURE — 99204 PR OFFICE/OUTPT VISIT, NEW, LEVL IV, 45-59 MIN: ICD-10-PCS | Mod: S$GLB,,, | Performed by: NURSE PRACTITIONER

## 2023-11-10 PROCEDURE — 97803 MED NUTRITION INDIV SUBSEQ: CPT | Mod: GZ,S$GLB,,

## 2023-11-10 NOTE — PROGRESS NOTES
NUTRITION NOTE    Referring Physician: Dr. Escalante  Reason for MNT Referral: Medically Supervised Diet pending Gastric Sleeve    PAST MEDICAL HISTORY:    Patient presents for a visit for MSD with weight at a standstill over the past month; total weight loss by making numerous dietary and lifestyle changes.    Past Medical History:   Diagnosis Date    Allergy     Anxiety     Bipolar disorder     Schizophrenia     Sleep apnea, unspecified      CLINICAL DATA:  28 y.o. female.    There were no vitals filed for this visit.    Current Weight: 327 lbs  Weight Change Since Initial Visit: -14 lbs  Ideal Body Weight: 115 lbs  BMI: 42.02    CURRENT DIET:  Regular diet.  Diet Recall: Food records are present.    Diet Includes:    Breakfast (8-9AM): yogurt, orange, or protein shake (Equate)   Lunch (12-1PM): skipping some; 1 chicken thigh or 1 salmon or green beans   Snack: cheese stick, popcorn, or candy   Dinner (4:30PM): dining out (large Poke bowl); left overs; 2 grilled cheese + 2 chicken thighs  Meal Pattern: Irregular.  Protein Supplements: 0-1 per day.  Snacking: Excess. Snacks include healthy choices and junk foods.    Vegetables: Likes a variety. Eats 2-3 times per week.  Fruits: Likes a variety. Eats 2-3 times per week.  Beverages: sparkling water, OJ, water with SF packets, tea w/ splenda. No soda or energy drinks.   Dining out: x2-3, Weekly. Mostly fast food and take-out.  Cooking at home: Weekly. Mostly baked and skillet, roasting  meat, fish, and vegetables.    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  Alcohol: None.  Smoking: None.    ASSESSMENT:  Patient demonstrates some willingness to change lifestyle habits as evidenced by good exercise, dietary changes, including protein drinks, increased fruits, increased vegetables, and more food preparation at  home.    Doing poor-fair with working on greatest challenges (portion control, snacking at night, and emotional eating).    Adequate calorie intake.  Inadequate protein intake.    Patient reports struggling with food addiction. Known triggers include coffee and pasta. Reporting binge episode on halloween with pizza and candy. Meeting with psych to assist with binge eating episodes. Continues to struggle with irregular meal pattern. Reports having consistent meal times when eating. Discussion of adding timers or reminders to ensure lunch. Reviewed habits to focus on for mindful eating techniques. Discussed importance of decreasing snack intake and snack quality.       RD would like to see the patient become more independent in meal preparation as she is relying heavily on her mother for grocery shopping and food preparation. Patient will need to demonstrate the ability to make meals with appropriate food items to ensure success with bariatric surgery. RD recommends continued psych visits due to recent binging episode.     Reviewed pre and post-operative diet progression. Answered all questions.     PLAN:    Diet: Adjust diet plan.    Exercise: Increase.    Behavior Modification: Begin to document food & activity logs daily. Removing sparkling water and orange juice. Ensure 3 meals/day by setting timers/reminders. Protein source at each meal. Simple meal preparation such as eggs with whole wheat toast or fruit. Try tuna salad, chicken salad, or egg salad on whole grain crackers.  Consider cold cuts in whole grain wrap.  Try Lean Cuisine, Healthy Choice, or Atkins meals for easy preparation. Decrease snacks to once daily consisting of protein and fiber such as fruit + cheese, vegetable + PB, nuts + fruit.     Weight loss prior to surgery (5-10% TBW): -14 lbs    Return to clinic in one month.    SESSION TIME:  60 minutes   complains of pain/discomfort

## 2023-11-10 NOTE — PROGRESS NOTES
Medically Supervised Weight Loss Documentation    Chief Complaint   Patient presents with    Follow-up    Obesity       History of Present Illness:  Patient is a 28 y.o. female who is referred for evaluation of surgical treatment of morbid obesity. Patient has a Body mass index is 42.02 kg/m². kg/m².  She has known comorbidities of depression and obstructive sleep apnea. Patient has attended the bariatric seminar and is most interested in gastric sleeve surgery evaluation of surgical treatment of morbid obesity.       Comorbidities:   Past Medical History:   Diagnosis Date    Allergy     Anxiety     Bipolar disorder     Schizophrenia     Sleep apnea, unspecified      Past Surgical History:   Procedure Laterality Date    BLADDER SURGERY      INCONTINENCE SURGERY       Family History   Problem Relation Age of Onset    Alcohol abuse Mother     Breast cancer Neg Hx     Ovarian cancer Neg Hx      Social History     Tobacco Use    Smoking status: Never    Smokeless tobacco: Never   Substance Use Topics    Alcohol use: No     Alcohol/week: 0.0 standard drinks of alcohol    Drug use: No        Review of patient's allergies indicates:  No Known Allergies    Medications:  Current Outpatient Medications on File Prior to Visit   Medication Sig Dispense Refill    busPIRone (BUSPAR) 15 MG tablet Take 15 mg by mouth 2 (two) times daily.      dextroamphetamine-amphetamine (ADDERALL) 20 mg tablet Take 1 tablet by mouth once daily.      escitalopram oxalate (LEXAPRO) 20 MG tablet Take 1 tablet (20 mg total) by mouth every evening.  0    hydrOXYzine pamoate (VISTARIL) 50 MG Cap Take 50 mg by mouth every evening.      metFORMIN (GLUCOPHAGE) 1000 MG tablet Take 1 tablet (1,000 mg total) by mouth 2 (two) times daily with meals. (Patient taking differently: Take 1,000 mg by mouth 2 (two) times daily with meals. Pt reports taking only once daily) 180 tablet 2    spironolactone (ALDACTONE) 50 MG tablet Take 1  "tablet (50 mg total) by mouth once daily. 30 tablet 11    cetirizine (ZYRTEC) 10 MG tablet Take 1 tablet (10 mg total) by mouth once daily. 30 tablet 2    drospirenone-ethinyl estradioL (MICHELLE, 28,) 3-0.03 mg per tablet Take 1 tablet by mouth once daily. 90 tablet 3    TETRAcaine HCL 0.5% (PONTOCAINE) 0.5 % ophthalmic solution 1-2 gtts A.D. every 2-4hrs as needed for pain control (OK to use in EARS) (Patient not taking: Reported on 11/10/2023) 15 mL 1     No current facility-administered medications on file prior to visit.         Body mass index is 42.02 kg/m².     Beginning Weight: 245 lbs    Last Weight: 237 lbs    Current Weight: 237 lbs   Weight Change: -8 lbs      Body comp:  Fat Percent:  47.4 %  Fat Mass:  112.4 lb  FFM:  124.8 lb  TBW: 91.6 lb  TBW %:  38.6 %  BMR: 1801 kcal    Wt Readings from Last 5 Encounters:   11/10/23 107.6 kg (237 lb 3.2 oz)   10/19/23 107.6 kg (237 lb 3.2 oz)   09/22/23 106.7 kg (235 lb 3.2 oz)   07/07/23 111.3 kg (245 lb 6 oz)   06/15/23 114.3 kg (251 lb 15.8 oz)         Chart review:  Primary Care Physician: Edmund      Lab review:  Most Recent Data:  CBC:   Lab Results   Component Value Date    WBC 8.8 01/25/2021    HGB 12.2 01/25/2021    HCT 37.7 01/25/2021     01/25/2021    MCV 91.5 01/25/2021    RDW 12.0 01/25/2021     BMP:   Lab Results   Component Value Date     01/25/2023    K 4.1 01/25/2023     01/25/2023    CO2 25 01/25/2023    BUN 13 01/25/2023    CREATININE 0.81 01/25/2023    GLU 93 01/25/2023    CALCIUM 8.7 01/25/2023     LFTs:   Lab Results   Component Value Date    PROT 6.5 01/25/2023    ALBUMIN 3.7 01/25/2023    BILITOT 0.3 01/25/2023    AST 10 01/25/2023    ALKPHOS 62 05/19/2020    ALT 11 01/25/2023     Coags: No results found for: "INR", "PROTIME", "PTT"  FLP:   Lab Results   Component Value Date    CHOL 184 11/28/2009    HDL 42 11/28/2009    LDLCALC 115.8 11/28/2009    TRIG 131 11/28/2009    CHOLHDL 22.8 11/28/2009     DM:   Lab Results " "  Component Value Date    HGBA1C 5.5 01/25/2023    HGBA1C 5.4 05/10/2022    HGBA1C 5.5 11/28/2009    LDLCALC 115.8 11/28/2009    CREATININE 0.81 01/25/2023     Thyroid:   Lab Results   Component Value Date    TSH 1.925 05/10/2022    FREET4 1.16 11/28/2009    T9TSMMT 8.5 04/19/2005    K8PIWFP 152 04/19/2005     Anemia:   Lab Results   Component Value Date    WCMJKNUZ81 503 05/19/2020     Cardiac: No results found for: "TROPONINI", "CKTOTAL", "CKMB", "BNP"  Urinalysis: No results found for: "LABURIN", "COLORU", "PHUA", "CLARITYU", "SPECGRAV", "LABSPEC", "NITRITE", "PROTEINUR", "GLUCOSEU", "KETONESU", "UROBILINOGEN", "BILIRUBINUR", "BLOODU", "RBCU", "WBCUA"      Radiology review: Images independently reviewed and interpreted.    UGI- no reflux      Other Results:  EKG (my interpretation): there are no previous tracings available for comparison.        Review of Systems:  Review of Systems   HENT:  Positive for congestion and sinus pressure.    Respiratory:  Positive for chest tightness and shortness of breath.    Genitourinary:  Positive for menstrual problem.   Musculoskeletal:  Positive for back pain.   Neurological:  Positive for speech difficulty.   Psychiatric/Behavioral:  Negative for agitation, behavioral problems, decreased concentration, hallucinations, self-injury and suicidal ideas. The patient is nervous/anxious.    All other systems reviewed and are negative.      Diet Education Discussed: Recommend high protein, low carb meals- mainly meats and vegetables.    Breakfast 8 am:  protein shake, fruit  Lunch:  vegetables (bell peppers and green beans, protein (beef, chicken, fish)  Dinner: vegetables (bell peppers and green beans, protein (beef, chicken, fish)  Water: 3-4 cups  No drinks  3 coffees per month  Tea with splenda  Struggle with meal times- occasionally skipping meals when focused      MVI:   Centrum OTC      Exercise/Activity Discussed: recommend cardiovascular exercise, get HR over 100 for 20 " "minutes 3 times per week-   Yard work  Walking 2 miles 3x/wk- 3-4 miles- trying to make this her hobby (at night, blue lights give headache)    Physical:     Vital Signs (Most Recent)  Temp: 98.3 °F (36.8 °C) (11/10/23 1143)  Pulse: 91 (11/10/23 1143)  Resp: 16 (11/10/23 1143)  BP: 122/73 (11/10/23 1143)  5' 3" (1.6 m)  107.6 kg (237 lb 3.2 oz)     Physical Exam:  Physical Exam  Vitals and nursing note reviewed.   Constitutional:       General: She is not in acute distress.     Appearance: Normal appearance. She is obese. She is not ill-appearing or toxic-appearing.   HENT:      Head: Normocephalic and atraumatic.      Right Ear: External ear normal.      Left Ear: External ear normal.      Nose: Nose normal. No congestion or rhinorrhea.      Mouth/Throat:      Mouth: Mucous membranes are moist.      Pharynx: Oropharynx is clear.   Eyes:      General:         Right eye: No discharge.         Left eye: No discharge.      Conjunctiva/sclera: Conjunctivae normal.   Cardiovascular:      Rate and Rhythm: Normal rate.      Pulses: Normal pulses.   Pulmonary:      Effort: Pulmonary effort is normal. No respiratory distress.   Abdominal:      Hernia: No hernia is present.   Musculoskeletal:         General: No swelling, tenderness, deformity or signs of injury. Normal range of motion.      Right lower leg: No edema.      Left lower leg: No edema.   Skin:     General: Skin is warm and dry.      Capillary Refill: Capillary refill takes less than 2 seconds.      Coloration: Skin is not jaundiced or pale.      Findings: No bruising, erythema or rash.   Neurological:      General: No focal deficit present.      Mental Status: She is alert and oriented to person, place, and time.      Motor: No weakness.      Gait: Gait normal.   Psychiatric:         Mood and Affect: Mood normal.         Behavior: Behavior normal.         Thought Content: Thought content normal.         Judgment: Judgment normal.     ASSESSMENT/PLAN:        1. " "Morbid obesity  BMP    CBC w/ Auto Differential    Folate Serum    H. Pylori Antibody, IGG    Hg A1c    Hepatic Panel    Iron & TIBC    Lipid Profile    Magnesium    Phosphorus    T3    TSH    Free T4    Vitamin B12    Vitamin B1    Vitamin D 25 Hydroxy      2. BMI 40.0-44.9, adult        3. Obstructive sleep apnea syndrome        4. Schizophrenia, unspecified type        5. Vitamin D deficiency  Vitamin D 25 Hydroxy      6. Encounter for vitamin deficiency screening  BMP    CBC w/ Auto Differential    Folate Serum    Iron & TIBC    Vitamin B12    Vitamin B1      7. Nutritional anemia, unspecified  Folate Serum    Iron & TIBC    Vitamin B12      8. Disorder of iron metabolism, unspecified  Hg A1c      9. Mild protein-calorie malnutrition  TSH    Free T4          Continue with Dr. Escalante's established plan on initial evaluation on 6/15/23       MSD      Patient will need:     Labs- need new  EKG- need  UGI- done  dietary consult- done  psych consult- re-eval in October  Seminar- done     Will obtain the following clearances prior to surgery: none        Plan for this patient:  Falls reviewed with patient  Continue Prescribed home medications   Discussed weight loss medication- does not qualify for injectables  Diet adherence  create rountine- still need to eat after 430- no eating after 8 pm  Be aware of mindful eating  Tanita scale results reviewed with patient   Decreased 4 lb fat   Increased 3 lb muscle   Increased 2 lb water  Exercise/Activity adherence- increase as tolerated  Continue multivitamins- discussion of post-operative life long MVI need, including Calcium, and a B-Complex  Complete preoperative clearances   PCP recommendations letter   Psych evaluation    Labs  Will send MBSAQIP Risk calculator- [email or web address]  Binge Eating disorder   Discussed at length- appears to have good grasp on insight to disorder. Wants surgery in 2024 to get through holiday season as "this is a trigger" for " her   Has good coping strategies- follow up with psych   Discussed post surgery to follow up weekly with psych, which she agrees

## 2023-11-16 ENCOUNTER — OFFICE VISIT (OUTPATIENT)
Dept: PSYCHIATRY | Facility: CLINIC | Age: 28
End: 2023-11-16
Payer: COMMERCIAL

## 2023-11-16 DIAGNOSIS — F20.9 SCHIZOPHRENIA, UNSPECIFIED TYPE: Primary | ICD-10-CM

## 2023-11-16 DIAGNOSIS — F31.9 BIPOLAR 1 DISORDER: ICD-10-CM

## 2023-11-16 PROCEDURE — 90832 PSYTX W PT 30 MINUTES: CPT | Mod: 95,,, | Performed by: PSYCHOLOGIST

## 2023-11-16 PROCEDURE — 3044F HG A1C LEVEL LT 7.0%: CPT | Mod: CPTII,95,, | Performed by: PSYCHOLOGIST

## 2023-11-16 PROCEDURE — 90832 PR PSYCHOTHERAPY W/PATIENT, 30 MIN: ICD-10-PCS | Mod: 95,,, | Performed by: PSYCHOLOGIST

## 2023-11-16 PROCEDURE — 3044F PR MOST RECENT HEMOGLOBIN A1C LEVEL <7.0%: ICD-10-PCS | Mod: CPTII,95,, | Performed by: PSYCHOLOGIST

## 2023-11-16 NOTE — PROGRESS NOTES
Individual Psychotherapy (PhD)    11/16/2023    Site:  [facility]         Therapeutic Intervention: Met with patient.  Outpatient - Supportive psychotherapy 30 min - CPT Code 21383    Chief complaint/reason for encounter: 3-month follow-up to assess for binge eating disorder remission    Interval history and content of current session: Ms. Case reported she has not engaged in binge eating in over three months. Therefore she no longer meets criteria for binge eating disorder. She did report excessive eating at a Halloween party (3 slices of pizza, 2 rolando, and candy); however, this behavior does not meet criteria for a binge eating episode. She also reported late night snacking when not hungry, but reported eating reasonable amount (e.g., an apple or cheese-stick or a grilled cheese). Discussed adverse bariatric surgery outcome risks associated with late night snacking and provided pt feedback about ways of coping with snacking urges. Pt reported she has continued in psychotherapy and med management appointments. Encouraged pt to increase frequency of therapy appointments directly before and in the weeks following bariatric surgery. She is cleared for bariatric surgery from a psychological perspective. She should continue meeting with bariatric team to monitor candidacy for bariatric surgery.    Treatment plan:  Target symptoms: Binge eating, snacking  Why chosen therapy is appropriate versus another modality: relevant to diagnosis  Outcome monitoring methods: self-report  Therapeutic intervention type: supportive psychotherapy    Risk parameters:  Patient reports no suicidal ideation  Patient reports no homicidal ideation  Patient reports no self-injurious behavior  Patient reports no violent behavior    Verbal deficits: None    Patient's response to intervention:  The patient's response to intervention is accepting.    Progress toward goals and other mental status changes:  The patient's progress toward  goals is good.    Diagnosis:   Schizophrenia  Major depressive disorder, in partial remission    Plan:  Continue meeting with bariatric team to monitor candidacy for bariatric surgery.    Return to clinic: as needed    Length of Service (minutes): 30

## 2023-11-24 ENCOUNTER — TELEPHONE (OUTPATIENT)
Dept: OBSTETRICS AND GYNECOLOGY | Facility: CLINIC | Age: 28
End: 2023-11-24
Payer: MEDICARE

## 2023-11-24 NOTE — TELEPHONE ENCOUNTER
----- Message from Katie Holcomb sent at 11/24/2023 11:28 AM CST -----  Contact: self  Type:  Sooner Appointment Request    Caller is requesting a sooner appointment.  Caller declined first available appointment listed below.  Caller will not accept being placed on the waitlist and is requesting a message be sent to doctor.    Name of Caller:  Patient  When is the first available appointment?  4/24  Symptoms:  med refill  Would the patient rather a call back or a response via [facility]? Call back  Best Call Back Number:  [phone]    Additional Information:  Please call back to [facility] appt

## 2023-12-13 ENCOUNTER — OFFICE VISIT (OUTPATIENT)
Dept: OBSTETRICS AND GYNECOLOGY | Facility: CLINIC | Age: 28
End: 2023-12-13
Payer: MEDICARE

## 2023-12-13 VITALS
BODY MASS INDEX: 42.86 KG/M2 | HEIGHT: 63 IN | WEIGHT: 241.88 LBS | SYSTOLIC BLOOD PRESSURE: 128 MMHG | DIASTOLIC BLOOD PRESSURE: 72 MMHG

## 2023-12-13 DIAGNOSIS — Z01.419 ENCOUNTER FOR WELL WOMAN EXAM WITH ROUTINE GYNECOLOGICAL EXAM: ICD-10-CM

## 2023-12-13 DIAGNOSIS — E28.2 PCOS (POLYCYSTIC OVARIAN SYNDROME): Primary | ICD-10-CM

## 2023-12-13 PROCEDURE — G0101 CA SCREEN;PELVIC/BREAST EXAM: HCPCS | Mod: GZ,S$GLB,,

## 2023-12-13 PROCEDURE — G0101 PR CA SCREEN;PELVIC/BREAST EXAM: ICD-10-PCS | Mod: GZ,S$GLB,,

## 2023-12-13 PROCEDURE — 3044F PR MOST RECENT HEMOGLOBIN A1C LEVEL <7.0%: ICD-10-PCS | Mod: CPTII,S$GLB,,

## 2023-12-13 PROCEDURE — 99999 PR PBB SHADOW E&M-EST. PATIENT-LVL II: ICD-10-PCS | Mod: PBBFAC,,,

## 2023-12-13 PROCEDURE — 88175 CYTOPATH C/V AUTO FLUID REDO: CPT

## 2023-12-13 PROCEDURE — 3078F PR MOST RECENT DIASTOLIC BLOOD PRESSURE < 80 MM HG: ICD-10-PCS | Mod: CPTII,S$GLB,,

## 2023-12-13 PROCEDURE — 3074F PR MOST RECENT SYSTOLIC BLOOD PRESSURE < 130 MM HG: ICD-10-PCS | Mod: CPTII,S$GLB,,

## 2023-12-13 PROCEDURE — 87624 HPV HI-RISK TYP POOLED RSLT: CPT

## 2023-12-13 PROCEDURE — 3008F PR BODY MASS INDEX (BMI) DOCUMENTED: ICD-10-PCS | Mod: CPTII,S$GLB,,

## 2023-12-13 PROCEDURE — 3008F BODY MASS INDEX DOCD: CPT | Mod: CPTII,S$GLB,,

## 2023-12-13 PROCEDURE — 3078F DIAST BP <80 MM HG: CPT | Mod: CPTII,S$GLB,,

## 2023-12-13 PROCEDURE — 3074F SYST BP LT 130 MM HG: CPT | Mod: CPTII,S$GLB,,

## 2023-12-13 PROCEDURE — 3044F HG A1C LEVEL LT 7.0%: CPT | Mod: CPTII,S$GLB,,

## 2023-12-13 PROCEDURE — 99999 PR PBB SHADOW E&M-EST. PATIENT-LVL II: CPT | Mod: PBBFAC,,,

## 2023-12-13 RX ORDER — DROSPIRENONE AND ETHINYL ESTRADIOL 0.03MG-3MG
1 KIT ORAL DAILY
Qty: 90 TABLET | Refills: 4 | Status: SHIPPED | OUTPATIENT
Start: 2023-12-13 | End: 2024-01-05 | Stop reason: SDUPTHER

## 2023-12-13 RX ORDER — SPIRONOLACTONE 50 MG/1
50 TABLET, FILM COATED ORAL DAILY
Qty: 30 TABLET | Refills: 11
Start: 2023-12-13 | End: 2023-12-20 | Stop reason: SDUPTHER

## 2023-12-13 NOTE — PROGRESS NOTES
HISTORY OF PRESENT ILLNESS:    Tina Case is a 28 y.o. female, , Patient's last menstrual period was 2023 (approximate).,  presents for a routine annual exam .   Last pap:  - normal   Gardisil?: Yes  Last mammogram: n/a  Last colon ca screening:  n/a   SA: not sexually active  Contraception: OCP (estrogen/progesterone). For PCOS   Sexually transmitted infection risk: very low risk of STD exposure.   Menstrual flow: regular every 28-30 days.    Doing well on michelle, some return of acne since stopping aldactone. Both refilled today.     This is the extent of the patient's complaints at this time.     Past Medical History:   Diagnosis Date    Allergy     Anxiety     Bipolar disorder     Schizophrenia     Sleep apnea, unspecified        Past Surgical History:   Procedure Laterality Date    BLADDER SURGERY      INCONTINENCE SURGERY         MEDICATIONS AND ALLERGIES:      Current Outpatient Medications:     busPIRone (BUSPAR) 15 MG tablet, Take 15 mg by mouth 2 (two) times daily., Disp: , Rfl:     cetirizine (ZYRTEC) 10 MG tablet, Take 1 tablet (10 mg total) by mouth once daily., Disp: 30 tablet, Rfl: 2    dextroamphetamine-amphetamine (ADDERALL) 20 mg tablet, Take 1 tablet by mouth once daily., Disp: , Rfl:     escitalopram oxalate (LEXAPRO) 20 MG tablet, Take 1 tablet (20 mg total) by mouth every evening., Disp: , Rfl: 0    hydrOXYzine pamoate (VISTARIL) 50 MG Cap, Take 50 mg by mouth every evening., Disp: , Rfl:     metFORMIN (GLUCOPHAGE) 1000 MG tablet, Take 1 tablet (1,000 mg total) by mouth 2 (two) times daily with meals., Disp: 180 tablet, Rfl: 2    semaglutide, weight loss, 0.25 mg/0.5 mL PnIj, Inject 0.25 mg into the skin every 7 days., Disp: 0.5 mL, Rfl: 2    drospirenone-ethinyl estradioL (MICHELLE, 28,) 3-0.03 mg per tablet, Take 1 tablet by mouth once daily., Disp: 90 tablet, Rfl: 4    spironolactone (ALDACTONE) 50 MG tablet, Take 1 tablet (50 mg total) by mouth once daily., Disp: 30  "tablet, Rfl: 11    Review of patient's allergies indicates:  No Known Allergies    Family History   Problem Relation Age of Onset    Alcohol abuse Mother     Breast cancer Neg Hx     Ovarian cancer Neg Hx        Social History     Socioeconomic History    Marital status: Single   Tobacco Use    Smoking status: Never    Smokeless tobacco: Never   Substance and Sexual Activity    Alcohol use: No     Alcohol/week: 0.0 standard drinks of alcohol    Drug use: No    Sexual activity: Never     Birth control/protection: None       OB History    Para Term  AB Living   0             SAB IAB Ectopic Multiple Live Births                      COMPREHENSIVE GYN HISTORY:  PAP History: Denies abnormal Paps.  Infection History: Denies STDs. Denies PID.  Benign History: Denies uterine fibroids. Denies ovarian cysts. Denies endometriosis. Denies other conditions.  Cancer History: Denies cervical cancer. Denies uterine cancer or hyperplasia. Denies ovarian cancer. Denies vulvar cancer or pre-cancer. Denies vaginal cancer or pre-cancer. Denies breast cancer. Denies colon cancer.      ROS:  GENERAL: No weight changes. No swelling. No fatigue. No fever.  BREASTS: No pain. No lumps. No discharge.  ABDOMEN: No pain. No nausea. No vomiting. No diarrhea. No constipation.  REPRODUCTIVE: No abnormal bleeding. No pelvic pain.   VULVA: No pain. No lesions. No itching.  VAGINA: No relaxation. No itching. No odor. No discharge. No lesions.  URINARY: No incontinence. No nocturia. No frequency. No dysuria.    /72   Ht 5' 3" (1.6 m)   Wt 109.7 kg (241 lb 13.5 oz)   LMP 2023 (Approximate)   BMI 42.84 kg/m²     PE:  Physical Exam     PROCEDURES/ORDERS:  Pap      Assessment/Plan:    PCOS (polycystic ovarian syndrome)  -     spironolactone (ALDACTONE) 50 MG tablet; Take 1 tablet (50 mg total) by mouth once daily.  Dispense: 30 tablet; Refill: 11  -     drospirenone-ethinyl estradioL (MICHELLE, 28,) 3-0.03 mg per tablet; Take 1 " tablet by mouth once daily.  Dispense: 90 tablet; Refill: 4    Encounter for well woman exam with routine gynecological exam  -     Liquid-Based Pap Smear, Screening  -     HPV High Risk Genotypes, PCR        COUNSELING:  The patient was counseled today on:  -A.C.S. Pap and pelvic exam guidelines, recomendations for yearly mammogram, monthly self breast exams and to follow up with her PCP for other health maintenance.    FOLLOW-UP  annually for [employer].

## 2023-12-15 ENCOUNTER — OFFICE VISIT (OUTPATIENT)
Dept: BARIATRICS | Facility: CLINIC | Age: 28
End: 2023-12-15
Payer: MEDICARE

## 2023-12-15 VITALS
WEIGHT: 235.63 LBS | DIASTOLIC BLOOD PRESSURE: 86 MMHG | HEART RATE: 107 BPM | SYSTOLIC BLOOD PRESSURE: 115 MMHG | TEMPERATURE: 98 F | BODY MASS INDEX: 41.75 KG/M2 | HEIGHT: 63 IN | RESPIRATION RATE: 16 BRPM

## 2023-12-15 DIAGNOSIS — E28.2 PCOS (POLYCYSTIC OVARIAN SYNDROME): ICD-10-CM

## 2023-12-15 DIAGNOSIS — E66.01 MORBID OBESITY: Primary | ICD-10-CM

## 2023-12-15 PROCEDURE — 3008F BODY MASS INDEX DOCD: CPT | Mod: CPTII,S$GLB,, | Performed by: NURSE PRACTITIONER

## 2023-12-15 PROCEDURE — 3008F PR BODY MASS INDEX (BMI) DOCUMENTED: ICD-10-PCS | Mod: CPTII,S$GLB,, | Performed by: NURSE PRACTITIONER

## 2023-12-15 PROCEDURE — 3079F PR MOST RECENT DIASTOLIC BLOOD PRESSURE 80-89 MM HG: ICD-10-PCS | Mod: CPTII,S$GLB,, | Performed by: NURSE PRACTITIONER

## 2023-12-15 PROCEDURE — 99999 PR PBB SHADOW E&M-EST. PATIENT-LVL III: ICD-10-PCS | Mod: PBBFAC,,, | Performed by: NURSE PRACTITIONER

## 2023-12-15 PROCEDURE — 3074F PR MOST RECENT SYSTOLIC BLOOD PRESSURE < 130 MM HG: ICD-10-PCS | Mod: CPTII,S$GLB,, | Performed by: NURSE PRACTITIONER

## 2023-12-15 PROCEDURE — 99214 OFFICE O/P EST MOD 30 MIN: CPT | Mod: S$GLB,,, | Performed by: NURSE PRACTITIONER

## 2023-12-15 PROCEDURE — 99999 PR PBB SHADOW E&M-EST. PATIENT-LVL III: CPT | Mod: PBBFAC,,, | Performed by: NURSE PRACTITIONER

## 2023-12-15 PROCEDURE — 3044F HG A1C LEVEL LT 7.0%: CPT | Mod: CPTII,S$GLB,, | Performed by: NURSE PRACTITIONER

## 2023-12-15 PROCEDURE — 3074F SYST BP LT 130 MM HG: CPT | Mod: CPTII,S$GLB,, | Performed by: NURSE PRACTITIONER

## 2023-12-15 PROCEDURE — 1159F PR MEDICATION LIST DOCUMENTED IN MEDICAL RECORD: ICD-10-PCS | Mod: CPTII,S$GLB,, | Performed by: NURSE PRACTITIONER

## 2023-12-15 PROCEDURE — 3044F PR MOST RECENT HEMOGLOBIN A1C LEVEL <7.0%: ICD-10-PCS | Mod: CPTII,S$GLB,, | Performed by: NURSE PRACTITIONER

## 2023-12-15 PROCEDURE — 1160F RVW MEDS BY RX/DR IN RCRD: CPT | Mod: CPTII,S$GLB,, | Performed by: NURSE PRACTITIONER

## 2023-12-15 PROCEDURE — 1159F MED LIST DOCD IN RCRD: CPT | Mod: CPTII,S$GLB,, | Performed by: NURSE PRACTITIONER

## 2023-12-15 PROCEDURE — 99214 PR OFFICE/OUTPT VISIT, EST, LEVL IV, 30-39 MIN: ICD-10-PCS | Mod: S$GLB,,, | Performed by: NURSE PRACTITIONER

## 2023-12-15 PROCEDURE — 3079F DIAST BP 80-89 MM HG: CPT | Mod: CPTII,S$GLB,, | Performed by: NURSE PRACTITIONER

## 2023-12-15 PROCEDURE — 1160F PR REVIEW ALL MEDS BY PRESCRIBER/CLIN PHARMACIST DOCUMENTED: ICD-10-PCS | Mod: CPTII,S$GLB,, | Performed by: NURSE PRACTITIONER

## 2023-12-15 NOTE — PROGRESS NOTES
Medically Supervised Weight Loss Documentation    Chief Complaint   Patient presents with    Follow-up    Obesity    Nutrition Counseling       History of Present Illness:  Patient is a 28 y.o. female who is referred for evaluation of surgical treatment of morbid obesity. Patient has a Body mass index is 41.73 kg/m². kg/m².  She has known comorbidities of depression, infertility, menstrual changes, and obstructive sleep apnea. Patient has attended the bariatric seminar and is most interested in gastric sleeve surgery evaluation of surgical treatment of morbid obesity.       Comorbidities:   Past Medical History:   Diagnosis Date    Allergy     Anxiety     Bipolar disorder     Schizophrenia     Sleep apnea, unspecified      Past Surgical History:   Procedure Laterality Date    BLADDER SURGERY      INCONTINENCE SURGERY       Family History   Problem Relation Age of Onset    Alcohol abuse Mother     Breast cancer Neg Hx     Ovarian cancer Neg Hx      Social History     Tobacco Use    Smoking status: Never    Smokeless tobacco: Never   Substance Use Topics    Alcohol use: No     Alcohol/week: 0.0 standard drinks of alcohol    Drug use: No        Review of patient's allergies indicates:  No Known Allergies    Medications:  Current Outpatient Medications on File Prior to Visit   Medication Sig Dispense Refill    busPIRone (BUSPAR) 15 MG tablet Take 15 mg by mouth 2 (two) times daily.      cetirizine (ZYRTEC) 10 MG tablet Take 1 tablet (10 mg total) by mouth once daily. 30 tablet 2    dextroamphetamine-amphetamine (ADDERALL) 20 mg tablet Take 1 tablet by mouth once daily.      drospirenone-ethinyl estradioL (MICHELLE, 28,) 3-0.03 mg per tablet Take 1 tablet by mouth once daily. 90 tablet 4    escitalopram oxalate (LEXAPRO) 20 MG tablet Take 1 tablet (20 mg total) by mouth every evening.  0    hydrOXYzine pamoate (VISTARIL) 50 MG Cap Take 50 mg by mouth every evening.      metFORMIN (GLUCOPHAGE) 1000 MG tablet Take 1  "tablet (1,000 mg total) by mouth 2 (two) times daily with meals. 180 tablet 2    semaglutide, weight loss, 0.25 mg/0.5 mL PnIj Inject 0.25 mg into the skin every 7 days. 0.5 mL 2    spironolactone (ALDACTONE) 50 MG tablet Take 1 tablet (50 mg total) by mouth once daily. 30 tablet 11     No current facility-administered medications on file prior to visit.         Body mass index is 41.73 kg/m².     Beginning Weight: 245 lbs    Last Weight: 237 lbs    Current Weight: 235 lbs   Weight Change: -10 lbs      Body comp:  Fat Percent:  49.1 %  Fat Mass:  115.6 lb  FFM:  120 lb  TBW: 88.2 lb  TBW %:  37.4 %  BMR: 1746 kcal    Wt Readings from Last 5 Encounters:   12/15/23 106.9 kg (235 lb 9.6 oz)   12/13/23 109.7 kg (241 lb 13.5 oz)   11/13/23 112.5 kg (248 lb)   11/10/23 107.6 kg (237 lb 3.2 oz)   10/19/23 107.6 kg (237 lb 3.2 oz)         Chart review:  Primary Care Physician: Edmund      Lab review:  Most Recent Data:  CBC:   Lab Results   Component Value Date    WBC 8.8 01/25/2021    HGB 12.2 01/25/2021    HCT 37.7 01/25/2021     01/25/2021    MCV 91.5 01/25/2021    RDW 12.0 01/25/2021     BMP:   Lab Results   Component Value Date     01/25/2023    K 4.1 01/25/2023     01/25/2023    CO2 25 01/25/2023    BUN 13 01/25/2023    CREATININE 0.81 01/25/2023    GLU 93 01/25/2023    CALCIUM 8.7 01/25/2023     LFTs:   Lab Results   Component Value Date    PROT 6.5 01/25/2023    ALBUMIN 3.7 01/25/2023    BILITOT 0.3 01/25/2023    AST 10 01/25/2023    ALKPHOS 62 05/19/2020    ALT 11 01/25/2023     Coags: No results found for: "INR", "PROTIME", "PTT"  FLP:   Lab Results   Component Value Date    CHOL 184 11/28/2009    HDL 42 11/28/2009    LDLCALC 115.8 11/28/2009    TRIG 131 11/28/2009    CHOLHDL 22.8 11/28/2009     DM:   Lab Results   Component Value Date    HGBA1C 5.5 01/25/2023    HGBA1C 5.4 05/10/2022    HGBA1C 5.5 11/28/2009    LDLCALC 115.8 11/28/2009    CREATININE 0.81 01/25/2023     Thyroid:   Lab Results " "  Component Value Date    TSH 1.925 05/10/2022    FREET4 1.16 11/28/2009    C0FJOTM 8.5 04/19/2005    D8WAGQJ 152 04/19/2005     Anemia:   Lab Results   Component Value Date    RZEPOUUR12 503 05/19/2020     Cardiac: No results found for: "TROPONINI", "CKTOTAL", "CKMB", "BNP"  Urinalysis: No results found for: "LABURIN", "COLORU", "PHUA", "CLARITYU", "SPECGRAV", "LABSPEC", "NITRITE", "PROTEINUR", "GLUCOSEU", "KETONESU", "UROBILINOGEN", "BILIRUBINUR", "BLOODU", "RBCU", "WBCUA"      Radiology review: Images independently reviewed and interpreted.    UGI- no reflux      Other Results:  EKG (my interpretation): there are no previous tracings available for comparison.        Review of Systems:  Review of Systems   HENT:  Negative for congestion and sinus pressure.    Respiratory:  Negative for chest tightness and shortness of breath.    Genitourinary:  Positive for menstrual problem.   Musculoskeletal:  Positive for back pain.   Neurological:  Positive for speech difficulty.   Psychiatric/Behavioral:  Positive for agitation. Negative for behavioral problems, decreased concentration, hallucinations, self-injury and suicidal ideas. The patient is nervous/anxious.    All other systems reviewed and are negative.        Diet Education Discussed: Recommend high protein, low carb meals- mainly meats and vegetables.    Breakfast 8 am:  protein shake 20 gm [employer], fruit, yogurt   Lunch:  protein shake 20 gm  Dinner: salmon, fruit salad and yogurt.  Snack: carrot and cauliflower, wants to try cucumber salad  Water: 3-4 cups  Eliminated coffee  No soda  1 energy drink in November  Tea with splenda- no longer with coffee creamer  Not skipping meals as often  Out to eat 3 x in November - "Sushi in a bowl", subway foot long (ate over entire day), sushi crunchy roll      MVI:   Centrum OTC      Exercise/Activity Discussed: recommend cardiovascular exercise, get HR over 100 for 20 minutes 3 times per week-   Yard work  Walking 2 " "miles 3x/wk- 3-4 miles- trying to make this her hobby (at night, blue lights give headache)    Physical:     Vital Signs (Most Recent)  Temp: 97.9 °F (36.6 °C) (12/15/23 1116)  Pulse: 107 (12/15/23 1116)  Resp: 16 (12/15/23 1116)  BP: 115/86 (12/15/23 1116)  5' 3" (1.6 m)  106.9 kg (235 lb 9.6 oz)     Physical Exam:  Physical Exam  Vitals and nursing note reviewed.   Constitutional:       General: She is not in acute distress.     Appearance: Normal appearance. She is obese. She is not ill-appearing or toxic-appearing.   HENT:      Head: Normocephalic and atraumatic.      Right Ear: External ear normal.      Left Ear: External ear normal.      Nose: Nose normal. No congestion or rhinorrhea.      Mouth/Throat:      Mouth: Mucous membranes are moist.      Pharynx: Oropharynx is clear.   Eyes:      General:         Right eye: No discharge.         Left eye: No discharge.      Conjunctiva/sclera: Conjunctivae normal.   Cardiovascular:      Rate and Rhythm: Normal rate.      Pulses: Normal pulses.   Pulmonary:      Effort: Pulmonary effort is normal. No respiratory distress.   Abdominal:      Hernia: No hernia is present.   Musculoskeletal:         General: No swelling, tenderness, deformity or signs of injury. Normal range of motion.      Right lower leg: No edema.      Left lower leg: No edema.   Skin:     General: Skin is warm and dry.      Capillary Refill: Capillary refill takes less than 2 seconds.      Coloration: Skin is not jaundiced or pale.      Findings: No bruising, erythema or rash.   Neurological:      General: No focal deficit present.      Mental Status: She is alert and oriented to person, place, and time.      Motor: No weakness.      Gait: Gait normal.   Psychiatric:         Mood and Affect: Mood normal.         Behavior: Behavior normal.         Thought Content: Thought content normal.         Judgment: Judgment normal.       ASSESSMENT/PLAN:        1. Morbid obesity        2. BMI 40.0-44.9, adult   "      3. PCOS (polycystic ovarian syndrome)              Continue with Dr. Escalante's established plan on initial evaluation on 6/15/23       MSD      Patient will need:     Labs- need new  EKG- need  UGI- done  dietary consult- done  psych consult- re-eval in October  Seminar- done     Will obtain the following clearances prior to surgery: none        Plan for this patient:  Falls reviewed with patient  Continue Prescribed home medications   Discussed weight loss medication- does not qualify for injectables  Diet adherence  create rountine- still need to eat after 430- no eating after 8 pm  Be aware of mindful eating  Has limited out to eat  Discusses holiday eating and portion sizes  Tanita scale results reviewed with patient   Increased 3 lb fat   decreased 4 lb muscle  Exercise/Activity adherence- increase as tolerated  Continue multivitamins- discussion of post-operative life long MVI need, including Calcium, and a B-Complex  Complete preoperative clearances   Psych evaluation   Binge Eating disorder   Discussed at length- appears to have good grasp on insight to disorder.    Has good coping strategies- follow up with psych regularly   Discussed post surgery to follow up weekly with psych, which she agrees

## 2023-12-18 LAB
HPV HR 12 DNA SPEC QL NAA+PROBE: NEGATIVE
HPV16 AG SPEC QL: NEGATIVE
HPV18 DNA SPEC QL NAA+PROBE: NEGATIVE

## 2023-12-20 DIAGNOSIS — E28.2 PCOS (POLYCYSTIC OVARIAN SYNDROME): ICD-10-CM

## 2023-12-20 RX ORDER — SPIRONOLACTONE 50 MG/1
50 TABLET, FILM COATED ORAL DAILY
Qty: 30 TABLET | Refills: 11
Start: 2023-12-20 | End: 2024-01-08

## 2023-12-20 NOTE — TELEPHONE ENCOUNTER
You saw this patient on 12/13/23.  It says your refilled this rx but it does not show where it was sent.  We received a refill request fax today from [facility].

## 2023-12-29 LAB
FINAL PATHOLOGIC DIAGNOSIS: NORMAL
Lab: NORMAL

## 2024-01-05 RX ORDER — DROSPIRENONE AND ETHINYL ESTRADIOL 0.03MG-3MG
1 KIT ORAL DAILY
Qty: 90 TABLET | Refills: 4 | Status: SHIPPED | OUTPATIENT
Start: 2024-01-05 | End: 2025-01-04

## 2024-01-08 RX ORDER — SPIRONOLACTONE 50 MG/1
50 TABLET, FILM COATED ORAL DAILY
Qty: 30 TABLET | Refills: 11 | Status: SHIPPED | OUTPATIENT
Start: 2024-01-08 | End: 2025-01-07

## 2024-01-26 ENCOUNTER — OFFICE VISIT (OUTPATIENT)
Dept: SURGERY | Facility: CLINIC | Age: 29
End: 2024-01-26
Payer: MEDICARE

## 2024-01-26 VITALS
HEIGHT: 63 IN | RESPIRATION RATE: 16 BRPM | BODY MASS INDEX: 42.46 KG/M2 | TEMPERATURE: 100 F | DIASTOLIC BLOOD PRESSURE: 75 MMHG | WEIGHT: 239.63 LBS | HEART RATE: 79 BPM | SYSTOLIC BLOOD PRESSURE: 128 MMHG

## 2024-01-26 DIAGNOSIS — E66.01 MORBID OBESITY: Primary | ICD-10-CM

## 2024-01-26 DIAGNOSIS — E66.01 CLASS 3 SEVERE OBESITY DUE TO EXCESS CALORIES WITH SERIOUS COMORBIDITY AND BODY MASS INDEX (BMI) OF 40.0 TO 44.9 IN ADULT: ICD-10-CM

## 2024-01-26 DIAGNOSIS — G47.33 OBSTRUCTIVE SLEEP APNEA SYNDROME: ICD-10-CM

## 2024-01-26 PROCEDURE — 99213 OFFICE O/P EST LOW 20 MIN: CPT | Mod: S$GLB,,, | Performed by: SURGERY

## 2024-01-26 PROCEDURE — 99999 PR PBB SHADOW E&M-EST. PATIENT-LVL IV: CPT | Mod: PBBFAC,,, | Performed by: SURGERY

## 2024-01-26 NOTE — PROGRESS NOTES
Medically Supervised Weight Loss Documentation    Date of Visit: 01/26/2024    Patient: Tina Case    Current Weight: 239  Current BMI: Body mass index is 42.44 kg/m².  Weight Change: -12    Last Weight: 237    Beginning Weight: 251      Vitals:   Vitals:    01/26/24 0937   BP: 128/75   Pulse: 79   Resp: 16   Temp: 99.7 °F (37.6 °C)       Comorbidities:   Past Medical History:   Diagnosis Date    Allergy     Anxiety     Bipolar disorder     Schizophrenia     Sleep apnea, unspecified        Medications:  Current Outpatient Medications on File Prior to Visit   Medication Sig Dispense Refill    busPIRone (BUSPAR) 15 MG tablet Take 15 mg by mouth 2 (two) times daily.      cetirizine (ZYRTEC) 10 MG tablet Take 1 tablet (10 mg total) by mouth once daily. 30 tablet 2    dextroamphetamine-amphetamine (ADDERALL) 20 mg tablet Take 1 tablet by mouth once daily.      drospirenone-ethinyl estradioL (MICHELLE, 28,) 3-0.03 mg per tablet Take 1 tablet by mouth once daily. 90 tablet 4    escitalopram oxalate (LEXAPRO) 20 MG tablet Take 1 tablet (20 mg total) by mouth every evening.  0    hydrOXYzine pamoate (VISTARIL) 50 MG Cap Take 50 mg by mouth every evening.      metFORMIN (GLUCOPHAGE) 1000 MG tablet Take 1 tablet (1,000 mg total) by mouth 2 (two) times daily with meals. 180 tablet 2    semaglutide, weight loss, 0.25 mg/0.5 mL PnIj Inject 0.25 mg into the skin every 7 days. 0.5 mL 2    spironolactone (ALDACTONE) 50 MG tablet Take 1 tablet (50 mg total) by mouth once daily. 30 tablet 11     No current facility-administered medications on file prior to visit.         Body comp:  Fat Percent:  49.4 %  Fat Mass:  118.4 lb  FFM:  121.2 lb  TBW: 89.4 lb  TBW %:  37.3 %  BMR: 1766 kcal      Diet Education Discussed:    Breakfast:  apples, protein shake  Lunch:  skips sometimes, will eat salmon with green beans, cup of yogurt or fruit  Dinner: protein shake; chicken nuggets    No sodas  Over holidays felt like volume was the  issue     Exercise/Activity Discussed:    Walking sometime    Behavior or Diet Goals for this patient:    Would like to see her start exercising  Would like to see some weight loss before surgery  Considering surgery    Labs  EKG  UGI -  Normal double-contrast upper GI study.  dietary consult- done  psych consult - dx with schizophrenia, will see what psych thinks- re rene in 3 months  Seminar     I will obtain the following clearances prior to surgery: none    : total time spent for visit: 20 minutes

## 2024-02-22 ENCOUNTER — PATIENT MESSAGE (OUTPATIENT)
Dept: BARIATRICS | Facility: CLINIC | Age: 29
End: 2024-02-22
Payer: MEDICARE

## 2024-02-23 ENCOUNTER — OFFICE VISIT (OUTPATIENT)
Dept: BARIATRICS | Facility: CLINIC | Age: 29
End: 2024-02-23
Payer: MEDICARE

## 2024-02-23 ENCOUNTER — TELEPHONE (OUTPATIENT)
Dept: BARIATRICS | Facility: CLINIC | Age: 29
End: 2024-02-23
Payer: MEDICARE

## 2024-02-23 VITALS
SYSTOLIC BLOOD PRESSURE: 133 MMHG | BODY MASS INDEX: 42.03 KG/M2 | WEIGHT: 237.19 LBS | DIASTOLIC BLOOD PRESSURE: 83 MMHG | RESPIRATION RATE: 16 BRPM | HEART RATE: 91 BPM | TEMPERATURE: 98 F | HEIGHT: 63 IN

## 2024-02-23 DIAGNOSIS — F20.9 SCHIZOPHRENIA, UNSPECIFIED TYPE: ICD-10-CM

## 2024-02-23 DIAGNOSIS — E66.01 MORBID OBESITY: Primary | ICD-10-CM

## 2024-02-23 PROCEDURE — 99999 PR PBB SHADOW E&M-EST. PATIENT-LVL III: CPT | Mod: PBBFAC,,, | Performed by: SURGERY

## 2024-02-23 PROCEDURE — 99213 OFFICE O/P EST LOW 20 MIN: CPT | Mod: S$GLB,,, | Performed by: SURGERY

## 2024-02-23 NOTE — TELEPHONE ENCOUNTER
----- Message from Kerry French sent at 2/23/2024 11:37 AM CST -----  Contact: pt  Type:  Patient Returning Call    Who Called: pt    Who Left Message for Patient: Jemma    Does the patient know what this is regarding?: unknown    Would the patient rather a call back or a response via [facility]?  Call back    Best Call Back Number:[phone]    Additional Information: please call to advise  Thanks    In no answer, message via portal    Thanks

## 2024-02-23 NOTE — PROGRESS NOTES
Medically Supervised Weight Loss Documentation    Date of Visit: 02/23/2024    Patient: Tina Case    Current Weight: 237  Current BMI: Body mass index is 42.02 kg/m².  Weight Change: -14 pounds    Last Weight: 239    Beginning Weight: 251      Vitals:   Vitals:    02/23/24 1511   BP: 133/83   Pulse: 91   Resp: 16   Temp: 98 °F (36.7 °C)       Comorbidities:   Past Medical History:   Diagnosis Date    Allergy     Anxiety     Bipolar disorder     Schizophrenia     Sleep apnea, unspecified        Medications:  Current Outpatient Medications on File Prior to Visit   Medication Sig Dispense Refill    busPIRone (BUSPAR) 15 MG tablet Take 15 mg by mouth 2 (two) times daily.      dextroamphetamine-amphetamine (ADDERALL) 20 mg tablet Take 1 tablet by mouth once daily.      drospirenone-ethinyl estradioL (MICHELLE, 28,) 3-0.03 mg per tablet Take 1 tablet by mouth once daily. 90 tablet 4    escitalopram oxalate (LEXAPRO) 20 MG tablet Take 1 tablet (20 mg total) by mouth every evening.  0    hydrOXYzine pamoate (VISTARIL) 50 MG Cap Take 50 mg by mouth every evening.      metFORMIN (GLUCOPHAGE) 1000 MG tablet Take 1 tablet (1,000 mg total) by mouth 2 (two) times daily with meals. 180 tablet 2    spironolactone (ALDACTONE) 50 MG tablet Take 1 tablet (50 mg total) by mouth once daily. 30 tablet 11    cetirizine (ZYRTEC) 10 MG tablet Take 1 tablet (10 mg total) by mouth once daily. 30 tablet 2    [DISCONTINUED] semaglutide, weight loss, 0.25 mg/0.5 mL PnIj Inject 0.25 mg into the skin every 7 days. (Patient not taking: Reported on 2/23/2024) 0.5 mL 2     No current facility-administered medications on file prior to visit.         Body comp:  Fat Percent:  45 %  Fat Mass:  106.8 lb  FFM:  130.4 lb  TBW: 95.6 lb  TBW %:  40.3 %  BMR: 1862 kcal      Diet Education Discussed:    Breakfast:  carrots, eggs  Lunch:  yogurt, protein shake  Dinner:  meat and vegetable    Exercise/Activity Discussed:    Walking (5-6 miles- 4 times  per week)  and working around the house    Behavior or Diet Goals for this patient:    Doing well  Continue low carb dieting  Continue exercising at least 20 minutes 3 times per week    Labs  EKG  UGI -  Normal double-contrast upper GI study.  dietary consult- done  psych consult - dx with schizophrenia, will see what psych thinks- re rene in 3 months  Seminar     I will obtain the following clearances prior to surgery: none    : total time spent for visit: 20 minutes

## 2024-02-23 NOTE — TELEPHONE ENCOUNTER
Returned call to pt. No answer, LVM with call back number.     Jaclyn Horn Staff  Caller: pt (Today, 11:12 AM)  Type: Needs Medical Advice         Who Called: pt  Best Call Back Number:[phone]  Additional Information: Requesting a call back regarding  pt returned call and asking for a call back please  Please Advise- Thank you

## 2024-03-22 ENCOUNTER — OFFICE VISIT (OUTPATIENT)
Dept: BARIATRICS | Facility: CLINIC | Age: 29
End: 2024-03-22
Payer: MEDICARE

## 2024-03-22 VITALS
TEMPERATURE: 99 F | HEIGHT: 63 IN | WEIGHT: 235.63 LBS | SYSTOLIC BLOOD PRESSURE: 137 MMHG | DIASTOLIC BLOOD PRESSURE: 85 MMHG | HEART RATE: 94 BPM | BODY MASS INDEX: 41.75 KG/M2 | RESPIRATION RATE: 16 BRPM

## 2024-03-22 DIAGNOSIS — E66.01 MORBID OBESITY: Primary | ICD-10-CM

## 2024-03-22 DIAGNOSIS — G47.33 OBSTRUCTIVE SLEEP APNEA SYNDROME: ICD-10-CM

## 2024-03-22 PROCEDURE — 99999 PR PBB SHADOW E&M-EST. PATIENT-LVL III: CPT | Mod: PBBFAC,,, | Performed by: SURGERY

## 2024-03-22 PROCEDURE — 99213 OFFICE O/P EST LOW 20 MIN: CPT | Mod: S$GLB,,, | Performed by: SURGERY

## 2024-03-22 NOTE — PROGRESS NOTES
Medically Supervised Weight Loss Documentation    Date of Visit: 03/22/2024    Patient: Tina Case    Current Weight: 235  Current BMI: Body mass index is 41.73 kg/m².  Weight Change: -26 pounds    Last Weight: 237    Beginning Weight: 251      Vitals:   Vitals:    03/22/24 1453   BP: 137/85   Pulse: 94   Resp: 16   Temp: 98.5 °F (36.9 °C)       Comorbidities:   Past Medical History:   Diagnosis Date    Allergy     Anxiety     Bipolar disorder     Schizophrenia     Sleep apnea, unspecified        Medications:  Current Outpatient Medications on File Prior to Visit   Medication Sig Dispense Refill    busPIRone (BUSPAR) 15 MG tablet Take 15 mg by mouth 2 (two) times daily.      cetirizine (ZYRTEC) 10 MG tablet Take 1 tablet (10 mg total) by mouth once daily. 30 tablet 2    dextroamphetamine-amphetamine (ADDERALL) 20 mg tablet Take 1 tablet by mouth once daily.      drospirenone-ethinyl estradioL (MICHELLE, 28,) 3-0.03 mg per tablet Take 1 tablet by mouth once daily. 90 tablet 4    escitalopram oxalate (LEXAPRO) 20 MG tablet Take 1 tablet (20 mg total) by mouth every evening.  0    hydrOXYzine pamoate (VISTARIL) 50 MG Cap Take 50 mg by mouth every evening.      metFORMIN (GLUCOPHAGE) 1000 MG tablet Take 1 tablet (1,000 mg total) by mouth 2 (two) times daily with meals. 180 tablet 2    spironolactone (ALDACTONE) 50 MG tablet Take 1 tablet (50 mg total) by mouth once daily. 30 tablet 11     No current facility-administered medications on file prior to visit.         Body comp:  Fat Percent:  47.7 %  Fat Mass:  112.4 lb  FFM:  123.2 lb  TBW: 90.6 lb  TBW %:  38.5 %  BMR: 1781 kcal    Diet Education Discussed:    Breakfast:  yogurt fruit carrots  Lunch:  meats and vegetables  Dinner:  meats and vegetables    Exercise/Activity Discussed:  Walking about 3 miles almost daily     Behavior or Diet Goals for this patient:    Continue low carb dieting  Exercise as much as possible    She wants surgery but not sure she is  not ready.  We were thinking about surgery closer to summer.    Labs  EKG  UGI -  Normal double-contrast upper GI study.  dietary consult- done  psych consult - dx with schizophrenia has shown stability for months- will consider surgery closer to summer     I will obtain the following clearances prior to surgery: none    : total time spent for visit: 20 minutes

## 2024-04-26 ENCOUNTER — TELEPHONE (OUTPATIENT)
Dept: BARIATRICS | Facility: CLINIC | Age: 29
End: 2024-04-26
Payer: MEDICARE

## 2024-04-26 ENCOUNTER — OFFICE VISIT (OUTPATIENT)
Dept: BARIATRICS | Facility: CLINIC | Age: 29
End: 2024-04-26
Payer: MEDICARE

## 2024-04-26 VITALS
DIASTOLIC BLOOD PRESSURE: 59 MMHG | TEMPERATURE: 98 F | HEIGHT: 63 IN | WEIGHT: 232.63 LBS | SYSTOLIC BLOOD PRESSURE: 126 MMHG | BODY MASS INDEX: 41.22 KG/M2 | RESPIRATION RATE: 16 BRPM | HEART RATE: 91 BPM

## 2024-04-26 DIAGNOSIS — E66.01 MORBID OBESITY: Primary | ICD-10-CM

## 2024-04-26 DIAGNOSIS — G47.33 OBSTRUCTIVE SLEEP APNEA SYNDROME: ICD-10-CM

## 2024-04-26 PROCEDURE — 1159F MED LIST DOCD IN RCRD: CPT | Mod: CPTII,S$GLB,, | Performed by: SURGERY

## 2024-04-26 PROCEDURE — 99999 PR PBB SHADOW E&M-EST. PATIENT-LVL III: CPT | Mod: PBBFAC,,, | Performed by: SURGERY

## 2024-04-26 PROCEDURE — 99213 OFFICE O/P EST LOW 20 MIN: CPT | Mod: S$GLB,,, | Performed by: SURGERY

## 2024-04-26 PROCEDURE — 3008F BODY MASS INDEX DOCD: CPT | Mod: CPTII,S$GLB,, | Performed by: SURGERY

## 2024-04-26 PROCEDURE — 3078F DIAST BP <80 MM HG: CPT | Mod: CPTII,S$GLB,, | Performed by: SURGERY

## 2024-04-26 PROCEDURE — 3074F SYST BP LT 130 MM HG: CPT | Mod: CPTII,S$GLB,, | Performed by: SURGERY

## 2024-04-26 NOTE — PROGRESS NOTES
Medically Supervised Weight Loss Documentation    Date of Visit: 04/26/2024    Patient: Tina Case    Current Weight: 232  Current BMI: Body mass index is 41.2 kg/m².  Weight Change: - 19 pounds    Last Weight: 235    Beginning Weight: 251      Vitals:   Vitals:    04/26/24 1403   BP: (!) 126/59   Pulse: 91   Resp: 16   Temp: 97.6 °F (36.4 °C)       Comorbidities:   Past Medical History:   Diagnosis Date    Allergy     Anxiety     Bipolar disorder     Schizophrenia     Sleep apnea, unspecified        Medications:  Current Outpatient Medications on File Prior to Visit   Medication Sig Dispense Refill    busPIRone (BUSPAR) 15 MG tablet Take 15 mg by mouth 2 (two) times daily.      cetirizine (ZYRTEC) 10 MG tablet Take 1 tablet (10 mg total) by mouth once daily. 30 tablet 2    dextroamphetamine-amphetamine (ADDERALL) 20 mg tablet Take 1 tablet by mouth once daily.      drospirenone-ethinyl estradioL (MICHELLE, 28,) 3-0.03 mg per tablet Take 1 tablet by mouth once daily. 90 tablet 4    escitalopram oxalate (LEXAPRO) 20 MG tablet Take 1 tablet (20 mg total) by mouth every evening.  0    hydrOXYzine pamoate (VISTARIL) 50 MG Cap Take 50 mg by mouth every evening.      metFORMIN (GLUCOPHAGE) 1000 MG tablet Take 1 tablet (1,000 mg total) by mouth 2 (two) times daily with meals. 180 tablet 2    spironolactone (ALDACTONE) 50 MG tablet Take 1 tablet (50 mg total) by mouth once daily. 30 tablet 11     No current facility-administered medications on file prior to visit.         Body comp:  Fat Percent:  47 %  Fat Mass:  109.4 lb  FFM:  123.2 lb  TBW: 90.4 lb  TBW %:  38.9 %  BMR: 1776 kcal      Diet Education Discussed:    Breakfast:  yogurt fruit carrots  Lunch:  meat and vegetables  Dinner:  meat and vegetables    Exercise/Activity Discussed:    Riding 10 miles per day     Behavior or Diet Goals for this patient:    Continue low carb dieting  Increase exercise intensity    Labs  EKG  UGI -  Normal double-contrast  upper GI study.  dietary consult- done  psych consult - dx with schizophrenia has shown stability for months- will consider surgery closer to summer     I will obtain the following clearances prior to surgery: none  : total time spent for visit: 20 minutes

## 2024-05-16 ENCOUNTER — OFFICE VISIT (OUTPATIENT)
Dept: BARIATRICS | Facility: CLINIC | Age: 29
End: 2024-05-16
Payer: MEDICARE

## 2024-05-16 VITALS
RESPIRATION RATE: 16 BRPM | HEIGHT: 63 IN | DIASTOLIC BLOOD PRESSURE: 77 MMHG | HEART RATE: 82 BPM | TEMPERATURE: 98 F | SYSTOLIC BLOOD PRESSURE: 118 MMHG | WEIGHT: 231.38 LBS | BODY MASS INDEX: 41 KG/M2

## 2024-05-16 DIAGNOSIS — E66.01 MORBID OBESITY: Primary | ICD-10-CM

## 2024-05-16 DIAGNOSIS — G47.33 OBSTRUCTIVE SLEEP APNEA SYNDROME: ICD-10-CM

## 2024-05-16 PROCEDURE — 3078F DIAST BP <80 MM HG: CPT | Mod: CPTII,S$GLB,, | Performed by: SURGERY

## 2024-05-16 PROCEDURE — 99213 OFFICE O/P EST LOW 20 MIN: CPT | Mod: S$GLB,,, | Performed by: SURGERY

## 2024-05-16 PROCEDURE — 3008F BODY MASS INDEX DOCD: CPT | Mod: CPTII,S$GLB,, | Performed by: SURGERY

## 2024-05-16 PROCEDURE — 99999 PR PBB SHADOW E&M-EST. PATIENT-LVL III: CPT | Mod: PBBFAC,,, | Performed by: SURGERY

## 2024-05-16 PROCEDURE — 3074F SYST BP LT 130 MM HG: CPT | Mod: CPTII,S$GLB,, | Performed by: SURGERY

## 2024-05-16 PROCEDURE — 1159F MED LIST DOCD IN RCRD: CPT | Mod: CPTII,S$GLB,, | Performed by: SURGERY

## 2024-05-16 NOTE — PATIENT INSTRUCTIONS
2 week diet    Breakfast- protein shake  Fast 4-6 hours  Lunch- protein shake  Fast 4-6 hours  Dinner- 3 ounces of meat and vegetables ( from list)  Fast 2 hours- go to bed  Wake up  repeat    NO SNACKING  Only water to drink

## 2024-05-16 NOTE — PROGRESS NOTES
Medically Supervised Weight Loss Documentation    Date of Visit: 05/16/2024    Patient: Tina Case    Current Weight: 231  Current BMI: Body mass index is 40.99 kg/m².  Weight Change: -20    Last Weight: 232    Beginning Weight: 251      Vitals:   Vitals:    05/16/24 1115   BP: 118/77   Pulse: 82   Resp: 16   Temp: 97.5 °F (36.4 °C)       Comorbidities:   Past Medical History:   Diagnosis Date    Allergy     Anxiety     Bipolar disorder     Schizophrenia     Sleep apnea, unspecified        Medications:  Current Outpatient Medications on File Prior to Visit   Medication Sig Dispense Refill    busPIRone (BUSPAR) 15 MG tablet Take 15 mg by mouth 2 (two) times daily.      cetirizine (ZYRTEC) 10 MG tablet Take 1 tablet (10 mg total) by mouth once daily. 30 tablet 2    dextroamphetamine-amphetamine (ADDERALL) 20 mg tablet Take 1 tablet by mouth once daily.      drospirenone-ethinyl estradioL (MICHELLE, 28,) 3-0.03 mg per tablet Take 1 tablet by mouth once daily. 90 tablet 4    escitalopram oxalate (LEXAPRO) 20 MG tablet Take 1 tablet (20 mg total) by mouth every evening.  0    hydrOXYzine pamoate (VISTARIL) 50 MG Cap Take 50 mg by mouth every evening.      metFORMIN (GLUCOPHAGE) 1000 MG tablet Take 1 tablet (1,000 mg total) by mouth 2 (two) times daily with meals. 180 tablet 2    spironolactone (ALDACTONE) 50 MG tablet Take 1 tablet (50 mg total) by mouth once daily. 30 tablet 11     No current facility-administered medications on file prior to visit.         Body comp:  Fat Percent:  45.6 %  Fat Mass:  105.6 lb  FFM:  125.8 lb  TBW: 92.4 lb  TBW %:  39.9 %  BMR: 1802 kcal      Diet Education Discussed:    Breakfast:  steamed cabbage; scrambled eggs  Lunch:  fish/red meat/pork with vegetables  Dinner:  same as lunch  Snacks: fruits once per day     Exercise/Activity Discussed:    Riding miles per day     Behavior or Diet Goals for this patient:    Continue low carb dieting  Exercise as much as  possible    Labs  EKG  UGI -  Normal double-contrast upper GI study.  dietary consult- done  psych consult - dx with schizophrenia has shown stability for months- she still sees vickie    We talked about getting her mother on the same page as her but she wants surgery despite this.     I will obtain the following clearances prior to surgery: none      : total time spent for visit: 20 minutes

## 2024-06-20 ENCOUNTER — OFFICE VISIT (OUTPATIENT)
Dept: BARIATRICS | Facility: CLINIC | Age: 29
End: 2024-06-20
Payer: MEDICARE

## 2024-06-20 VITALS
WEIGHT: 226.19 LBS | HEIGHT: 63 IN | RESPIRATION RATE: 16 BRPM | BODY MASS INDEX: 40.08 KG/M2 | SYSTOLIC BLOOD PRESSURE: 105 MMHG | DIASTOLIC BLOOD PRESSURE: 70 MMHG | TEMPERATURE: 98 F | HEART RATE: 72 BPM

## 2024-06-20 DIAGNOSIS — F20.9 SCHIZOPHRENIA, UNSPECIFIED TYPE: ICD-10-CM

## 2024-06-20 DIAGNOSIS — G47.33 OBSTRUCTIVE SLEEP APNEA SYNDROME: ICD-10-CM

## 2024-06-20 DIAGNOSIS — E66.01 MORBID OBESITY: Primary | ICD-10-CM

## 2024-06-20 PROCEDURE — 3078F DIAST BP <80 MM HG: CPT | Mod: CPTII,S$GLB,, | Performed by: SURGERY

## 2024-06-20 PROCEDURE — 3074F SYST BP LT 130 MM HG: CPT | Mod: CPTII,S$GLB,, | Performed by: SURGERY

## 2024-06-20 PROCEDURE — 99213 OFFICE O/P EST LOW 20 MIN: CPT | Mod: S$GLB,,, | Performed by: SURGERY

## 2024-06-20 PROCEDURE — 99999 PR PBB SHADOW E&M-EST. PATIENT-LVL III: CPT | Mod: PBBFAC,,, | Performed by: SURGERY

## 2024-06-20 PROCEDURE — 1159F MED LIST DOCD IN RCRD: CPT | Mod: CPTII,S$GLB,, | Performed by: SURGERY

## 2024-06-20 PROCEDURE — 3008F BODY MASS INDEX DOCD: CPT | Mod: CPTII,S$GLB,, | Performed by: SURGERY

## 2024-06-20 NOTE — PROGRESS NOTES
Medically Supervised Weight Loss Documentation    Date of Visit: 06/20/2024    Patient: Tina Case    Current Weight: 226  Current BMI: Body mass index is 40.07 kg/m².  Weight Change: -25 pounds    Last Weight: 231    Beginning Weight: 251      Vitals:   Vitals:    06/20/24 1119   BP: 105/70   Pulse: 72   Resp: 16   Temp: 97.6 °F (36.4 °C)       Comorbidities:   Past Medical History:   Diagnosis Date    Allergy     Anxiety     Bipolar disorder     Schizophrenia     Sleep apnea, unspecified        Medications:  Current Outpatient Medications on File Prior to Visit   Medication Sig Dispense Refill    busPIRone (BUSPAR) 15 MG tablet Take 15 mg by mouth 2 (two) times daily.      cetirizine (ZYRTEC) 10 MG tablet Take 1 tablet (10 mg total) by mouth once daily. 30 tablet 2    dextroamphetamine-amphetamine (ADDERALL) 20 mg tablet Take 1 tablet by mouth once daily.      drospirenone-ethinyl estradioL (MICHELLE, 28,) 3-0.03 mg per tablet Take 1 tablet by mouth once daily. 90 tablet 4    escitalopram oxalate (LEXAPRO) 20 MG tablet Take 1 tablet (20 mg total) by mouth every evening.  0    hydrOXYzine pamoate (VISTARIL) 50 MG Cap Take 50 mg by mouth every evening.      metFORMIN (GLUCOPHAGE) 1000 MG tablet Take 1 tablet (1,000 mg total) by mouth 2 (two) times daily with meals. 180 tablet 2    spironolactone (ALDACTONE) 50 MG tablet Take 1 tablet (50 mg total) by mouth once daily. 30 tablet 11     No current facility-administered medications on file prior to visit.         Body comp:  Fat Percent:  47.4 %  Fat Mass:  107.2 lb  FFM:  119 lb  TBW: 87.2 lb  TBW %:  38.5 %  BMR: 1719 kcal      Diet Education Discussed:    Stress at home- didn't eat lunch bc of this    Breakfast:  yogurt, carrot sticks, piece of toast  Lunch:  skips  Dinner:  fish/chicken/beef/vegetables    Exercise/Activity Discussed:    Riding bicycle 10-11 miles    Behavior or Diet Goals for this patient:    Doing well- although composition appears to  have shown some weight gain   Continue low carb dieting  Continue exercising as much     Hold off on surgery for now    Labs  EKG  UGI -  Normal double-contrast upper GI study.  dietary consult- done  psych consult - dx with schizophrenia has shown stability for months- she still sees vickie     We talked about getting her mother on the same page as her but she wants surgery despite this.     I will obtain the following clearances prior to surgery: none  : total time spent for visit: 20 minutes

## 2024-07-22 ENCOUNTER — PATIENT MESSAGE (OUTPATIENT)
Dept: BARIATRICS | Facility: CLINIC | Age: 29
End: 2024-07-22
Payer: MEDICARE

## 2024-07-26 ENCOUNTER — CLINICAL SUPPORT (OUTPATIENT)
Dept: BARIATRICS | Facility: CLINIC | Age: 29
End: 2024-07-26
Payer: MEDICARE

## 2024-07-26 ENCOUNTER — PATIENT MESSAGE (OUTPATIENT)
Dept: BARIATRICS | Facility: CLINIC | Age: 29
End: 2024-07-26

## 2024-07-26 VITALS — BODY MASS INDEX: 40.72 KG/M2 | WEIGHT: 229.81 LBS | HEIGHT: 63 IN

## 2024-07-26 DIAGNOSIS — Z71.3 ENCOUNTER FOR DIETARY COUNSELING AND SURVEILLANCE: Primary | ICD-10-CM

## 2024-07-26 DIAGNOSIS — E66.01 MORBID OBESITY: ICD-10-CM

## 2024-07-26 PROCEDURE — 99999 PR PBB SHADOW E&M-EST. PATIENT-LVL III: CPT | Mod: PBBFAC,,,

## 2024-07-26 NOTE — PATIENT INSTRUCTIONS
Goals:   Removing sparkling water.   Ensure 3 meals/day by setting timers/reminders.  Protein source at each meal.  Decrease snacks to once daily consisting of protein and fiber such as fruit + cheese, vegetable + PB, nuts + fruit.   Begin implementing emotional eating questions to assist with reassessing circumstances.  Begin reading nutrition facts labels for food items.  Consistent activity for 30 minutes daily. If unable to bike, try [employer]anch[employer]ube video.  Begin tracking food via paper food log.

## 2024-07-26 NOTE — PROGRESS NOTES
NUTRITION NOTE    Referring Physician: Dr. Escalante  Reason for MNT Referral: Medically Supervised Diet pending Gastric Sleeve    PAST MEDICAL HISTORY:    Patient presents for a visit for MSD with weight gain over the past month; total weight loss by making numerous dietary and lifestyle changes.    Past Medical History:   Diagnosis Date    Allergy     Anxiety     Bipolar disorder     Schizophrenia     Sleep apnea, unspecified      CLINICAL DATA:  28 y.o. female.    There were no vitals filed for this visit.    Weight 229.8lbs  Fat%  46.9%  Fat mass 107.8lbs   FFM 122lbs  TBW  89.4lbs  TBW% 38.9%  BMI  40.71  Weight Change Since Initial Visit: -22 lbs  Ideal Body Weight: 115 lbs    CURRENT DIET:  Regular diet.  Diet Recall: Food records are present.    Diet Includes:    Breakfast (8-9AM): yogurt, pastries   Lunch (12-1PM): skipping at times, salad - chito lettuce, red onion, jalapeno, parmesan cheese, ranch, meal preps - fish/pork/chicken with vegetables   Snack: cheesestick, 1 apple, yogurt, candy, sweets, mac-n-cheese   Dinner (4:30PM): fish/pork/chicken with salad or green beans, carrot sticks; tofu with Terikayi sauce  Meal Pattern: Irregular.  Protein Supplements: 0-1 per day.  Snacking: Excess. Snacks include healthy choices and junk foods.    Vegetables: Likes a variety. Eats 2-3 times per week.  Fruits: Likes a variety. Eats 2-3 times per week.  Beverages: sparkling water, OJ, water with SF packets, tea w/ splenda. No soda or energy drinks.   Dining out: x2-3, Weekly. Mostly fast food and take-out for Chinese. Erwin, hot-n-spicy, and miso.  Cooking at home: Weekly. Mostly baked and skillet, roasting  meat, fish, and vegetables.    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  Alcohol: None.  Smoking:  None.    ASSESSMENT:  Patient demonstrates some willingness to change lifestyle habits as evidenced by good exercise, dietary changes, including protein drinks, increased fruits, increased vegetables, and more food preparation at home.    Doing poor-fair with working on greatest challenges (portion control, snacking at night, and emotional eating).    Excess calorie intake.  Inadequate protein intake.    Patient reports recent PTSD trigger that lead to schizophrenic episode, and increased oral intake. Meeting with psych to assist with PTSD, schizophrenia, and binge eating. Pt vocalized suicidal ideation. RD to notify psych of today's session and concerns. Known triggers include coffee and pasta. Reporting binge episodes have decreased starting on 7/16. Continues to struggle with irregular meal pattern. Reiterated having consistent meal times, adding timers or reminders for meals, focusing on mindful eating techniques. Reviewed list of questions to walk through to identify episodes of emotional eating. RD to message. Discussed importance of decreasing snack intake and snack quality. Reviewed reading of nutrition facts labels. Patient comprehended education with practicing skills on protein bar labels and sugar free candy labels. Answered all questions.     RD expressed concern for patient success with surgery. Patient agreed she needs more time with dieting then revisit discussion of surgery. RD stressed continued psych visits.      PLAN:    Diet: Adjust diet plan.    Exercise: Consistent activity for 30 minutes daily. If unable to bike, try [employer] video.    Behavior Modification: Begin to document food & activity logs daily. Removing sparkling water. Ensure 3 meals/day by setting timers/reminders. Protein source at each meal. Decrease snacks to once daily consisting of protein and fiber such as fruit + cheese, vegetable + PB, nuts + fruit. Begin implementing emotional eating questions to assist with reassessing  circumstances. Begin reading nutrition facts labels for food items.Begin tracking intake via food log.     Weight loss prior to surgery (5-10% TBW): -22 lbs    Return to clinic in one month.    SESSION TIME:  60 minutes

## 2024-07-30 ENCOUNTER — TELEPHONE (OUTPATIENT)
Dept: BARIATRICS | Facility: CLINIC | Age: 29
End: 2024-07-30
Payer: MEDICARE

## 2024-07-30 NOTE — TELEPHONE ENCOUNTER
RD called to follow up on portal message sent and medication changes discussed in session. Plans for virtual psych visit later today to discuss medication changes with provider.

## 2024-08-26 ENCOUNTER — PATIENT MESSAGE (OUTPATIENT)
Dept: BARIATRICS | Facility: CLINIC | Age: 29
End: 2024-08-26
Payer: MEDICARE

## 2024-08-30 ENCOUNTER — TELEPHONE (OUTPATIENT)
Dept: BARIATRICS | Facility: CLINIC | Age: 29
End: 2024-08-30
Payer: MEDICARE

## 2024-08-30 NOTE — TELEPHONE ENCOUNTER
----- Message from Justus Madrigal sent at 8/30/2024  3:24 PM CDT -----  Contact: Self  Type:  Sooner Appointment Request    Caller is requesting a sooner appointment.  Caller declined first available appointment listed below.  Caller will not accept being placed on the waitlist and is requesting a message be sent to doctor.    Name of Caller:  Patient    Would the patient rather a call back or a response via [facility]? Call Back  Best Call Back Number:  [phone]  Additional Information:  Patient is requesting a call back regarding an upcoming appt

## 2024-09-06 ENCOUNTER — CLINICAL SUPPORT (OUTPATIENT)
Dept: BARIATRICS | Facility: CLINIC | Age: 29
End: 2024-09-06
Payer: MEDICARE

## 2024-09-06 VITALS — WEIGHT: 233.44 LBS | HEIGHT: 63 IN | BODY MASS INDEX: 41.36 KG/M2

## 2024-09-06 DIAGNOSIS — Z71.3 ENCOUNTER FOR DIETARY COUNSELING AND SURVEILLANCE: Primary | ICD-10-CM

## 2024-09-06 DIAGNOSIS — E66.01 SEVERE OBESITY (BMI >= 40): ICD-10-CM

## 2024-09-06 PROCEDURE — 99999 PR PBB SHADOW E&M-EST. PATIENT-LVL III: CPT | Mod: PBBFAC,,,

## 2024-09-06 NOTE — PROGRESS NOTES
NUTRITION NOTE    Referring Physician: Dr. Escalante  Reason for MNT Referral: Medically Supervised Diet pending Gastric Sleeve    PAST MEDICAL HISTORY:    Patient presents for a visit for MSD with weight gain over the past month; total weight loss by making numerous dietary and lifestyle changes.    Past Medical History:   Diagnosis Date    Allergy     Anxiety     Bipolar disorder     Schizophrenia     Sleep apnea, unspecified      CLINICAL DATA:  28 y.o. female.    There were no vitals filed for this visit.    Weight 233lbs  BMI  41.36  Weight Change Since Initial Visit: -18 lbs  Ideal Body Weight: 115 lbs    CURRENT DIET:  Regular diet.  Diet Recall: Food records are present.    Diet Includes:    Breakfast (8-9AM): Dannon light and fit yogurt smoothie or 2 eggs with 1 slice ham and 2 pieces toast   Lunch (12-1PM): canned soup (gumbo, tomato, chicken noodle) and salad - chito lettuce, cucumber, tomato, red onion, jalapeno, parmesan cheese, baked chicken/crabs, ranch   Dinner (4:30PM): fish/pork/chicken with salad or green beans, carrot sticks; black beans with rotel and cilantro   Bedtime Snack: apple, mandarin, SF snowball, popcorn, ramen noodles, spoonful of honey  Meal Pattern: Improved.  Protein Supplements: 0-1 per day.  Snacking: Excess, binging at night. Snacks include healthy choices and junk foods.    Vegetables: Likes a variety. Eats 2-3 times per week.  Fruits: Likes a variety. Eats 2-3 times per week.  Beverages: x3 water bottles, x2 40oz tumblers with SF flavoring, diet soda x2/month, coconut cream coffee small, green/mint/white chocolate tea with splenda. No energy drinks.   Dining out: x1-2, Weekly. Mostly fast food and take-out for Chinese. Erwin, hot-n-spicy, and miso.  Cooking at home: Weekly. Mostly baked and skillet, roasting  meat, fish, and vegetables.    CURRENT EXERCISE: [place], riding bicycle for 15 miles when weather permits x4d/wk on average; walking 2d/wk for 2 miles    Vitamins / Minerals  / Herbs: Centrum MVI daily. 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  Alcohol: None.  Smoking: None.    ASSESSMENT:  Patient demonstrates some willingness to change lifestyle habits as evidenced by good exercise, dietary changes, increased fruits, increased vegetables, more food preparation at gay, and healthier snacking at home.    Doing poor-fair with working on greatest challenges (portion control, snacking at night, and emotional eating).    Excess calorie intake.  Inadequate protein intake.    Patient reports recent PTSD trigger that lead to schizophrenic episode, and increased oral intake. Meeting with psych to assist with PTSD, schizophrenia, and binge eating. Reporting continuing to struggling with food addiction. She has identified emotional triggers within counseling sessions. Reviewed list of questions to walk through to identify episodes of emotional eating. Printed list of questions to walk through for emotional eating. Discussed importance of decreasing snack intake and snack quality. Reiterated importance of fiber, fluids, and movement to assist with bowel movements. Reviewed reading of nutrition facts labels. Answered all questions.     RD expressed concern for patient success with surgery. Patient is no longer wanting to pursue bariatric surgery. Discussed potentially changing to medical weight loss with dieting. Continue to stress psych visits.      PLAN:    Diet: Adjust diet plan.    Exercise: Consistent activity for 30 minutes daily. If unable to bike, try you[employer] video.    Behavior Modification: Begin to document food & activity logs daily. Change toast to whole grain tortilla wraps. Protein source at each meal. 1 cup vegetables at lunch and dinner - no corn, peas, or potatoes. Decrease snacks to once daily consisting of protein and fiber such as fruit + cheese, vegetable +  PB, nuts + fruit. Begin implementing emotional eating questions to assist with reassessing circumstances. Remove soda, white chocolate tea, and honey. Begin tracking intake via food log.     Weight loss prior to surgery (5-10% TBW): -18 lbs    Return to clinic in one month.    SESSION TIME:  60 minutes

## 2024-09-30 ENCOUNTER — OFFICE VISIT (OUTPATIENT)
Dept: BARIATRICS | Facility: CLINIC | Age: 29
End: 2024-09-30
Payer: MEDICARE

## 2024-09-30 VITALS
RESPIRATION RATE: 16 BRPM | TEMPERATURE: 98 F | HEART RATE: 76 BPM | BODY MASS INDEX: 40.93 KG/M2 | WEIGHT: 231 LBS | DIASTOLIC BLOOD PRESSURE: 73 MMHG | HEIGHT: 63 IN | SYSTOLIC BLOOD PRESSURE: 107 MMHG

## 2024-09-30 DIAGNOSIS — E66.01 MORBID OBESITY: Primary | ICD-10-CM

## 2024-09-30 PROCEDURE — 99213 OFFICE O/P EST LOW 20 MIN: CPT | Mod: S$GLB,,, | Performed by: NURSE PRACTITIONER

## 2024-09-30 PROCEDURE — 99999 PR PBB SHADOW E&M-EST. PATIENT-LVL IV: CPT | Mod: PBBFAC,,, | Performed by: NURSE PRACTITIONER

## 2024-09-30 PROCEDURE — 3074F SYST BP LT 130 MM HG: CPT | Mod: CPTII,S$GLB,, | Performed by: NURSE PRACTITIONER

## 2024-09-30 PROCEDURE — 3008F BODY MASS INDEX DOCD: CPT | Mod: CPTII,S$GLB,, | Performed by: NURSE PRACTITIONER

## 2024-09-30 PROCEDURE — 3078F DIAST BP <80 MM HG: CPT | Mod: CPTII,S$GLB,, | Performed by: NURSE PRACTITIONER

## 2024-09-30 PROCEDURE — 1160F RVW MEDS BY RX/DR IN RCRD: CPT | Mod: CPTII,S$GLB,, | Performed by: NURSE PRACTITIONER

## 2024-09-30 PROCEDURE — 1159F MED LIST DOCD IN RCRD: CPT | Mod: CPTII,S$GLB,, | Performed by: NURSE PRACTITIONER

## 2024-09-30 RX ORDER — TOPIRAMATE 25 MG/1
25 TABLET ORAL 2 TIMES DAILY
Qty: 180 TABLET | Refills: 0 | Status: SHIPPED | OUTPATIENT
Start: 2024-09-30 | End: 2024-12-29

## 2024-11-08 ENCOUNTER — OFFICE VISIT (OUTPATIENT)
Dept: BARIATRICS | Facility: CLINIC | Age: 29
End: 2024-11-08
Payer: MEDICARE

## 2024-11-08 VITALS
BODY MASS INDEX: 41.14 KG/M2 | HEIGHT: 63 IN | DIASTOLIC BLOOD PRESSURE: 64 MMHG | TEMPERATURE: 97 F | WEIGHT: 232.19 LBS | SYSTOLIC BLOOD PRESSURE: 112 MMHG | HEART RATE: 91 BPM | RESPIRATION RATE: 16 BRPM

## 2024-11-08 DIAGNOSIS — E66.01 MORBID OBESITY: Primary | ICD-10-CM

## 2024-11-08 DIAGNOSIS — Z71.3 ENCOUNTER FOR DIETARY COUNSELING AND SURVEILLANCE: ICD-10-CM

## 2024-11-08 PROCEDURE — 99999 PR PBB SHADOW E&M-EST. PATIENT-LVL IV: CPT | Mod: PBBFAC,,, | Performed by: NURSE PRACTITIONER

## 2024-11-08 NOTE — PROGRESS NOTES
Medically Supervised Weight Loss Documentation    Date of Visit: 11/08/2024    Patient: Tina Case    Beginning Weight: 251    Last Weight: 231    Current Weight: 232  Current BMI: Body mass index is 41.13 kg/m².  Weight Change: -29 pounds          Vitals:   Vitals:    11/08/24 1414   BP: 112/64   Pulse: 91   Resp: 16   Temp: 97 °F (36.1 °C)         Comorbidities:   Past Medical History:   Diagnosis Date    Allergy     Anxiety     Bipolar disorder     Schizophrenia     Sleep apnea, unspecified        Medications:  Current Outpatient Medications on File Prior to Visit   Medication Sig Dispense Refill    busPIRone (BUSPAR) 15 MG tablet Take 15 mg by mouth 2 (two) times daily.      dextroamphetamine-amphetamine (ADDERALL) 20 mg tablet Take 1 tablet by mouth once daily.      drospirenone-ethinyl estradioL (MICHELLE, 28,) 3-0.03 mg per tablet Take 1 tablet by mouth once daily. 90 tablet 4    escitalopram oxalate (LEXAPRO) 20 MG tablet Take 1 tablet (20 mg total) by mouth every evening.  0    hydrOXYzine pamoate (VISTARIL) 50 MG Cap Take 50 mg by mouth every evening.      metFORMIN (GLUCOPHAGE) 1000 MG tablet TAKE 1 TABLET (1,000 MG TOTAL) BY MOUTH 2 (TWO) TIMES DAILY WITH MEALS. 60 tablet 0    spironolactone (ALDACTONE) 50 MG tablet Take 1 tablet (50 mg total) by mouth once daily. 30 tablet 11    cetirizine (ZYRTEC) 10 MG tablet Take 1 tablet (10 mg total) by mouth once daily. 30 tablet 2    topiramate (TOPAMAX) 25 MG tablet Take 1 tablet (25 mg total) by mouth 2 (two) times daily. (Patient not taking: Reported on 11/8/2024) 180 tablet 0     No current facility-administered medications on file prior to visit.         Body comp:  Fat Percent:  48.2 %  Fat Mass:  112.0 lb  FFM:  120.2 lb  TBW: 88.2 lb  TBW %:  38 %  BMR: 1740 kcal      Diet Education Discussed:  Stress at home- using food to cope, skipping few days  Breakfast:  eggs, turkey klein, yogurt, carrot sticks, piece of toast  Lunch:  meat/vegetables    Dinner:  fish/chicken/beef/pork- majority baked- vegetables, bell pepper, greens beans, carrots, cauliflower  Taste chaning- no longer liking Sushi  SF/Zero Calorie Sparkling/Flavored water  No energy drinks/soda  HX of Binge Eating- feels has improved. Biggest challenge is when out of house to stop for food. Feels like impulse has improved.    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  House work    MVI/ Herbal:  Fat burner (OTC)  Daily MVI  B12        Behavior or Diet Goals for this patient:  Continue with Prescribed Medications  Tanita Scale reviewed:  Increased 5 lb fat  Increased 1 lb muscle  Increased 1 lb water  Medication Discussion   Requested pt to Stop taking OTC fat burner in September, did not and now only taking 1 instead of 2  With Hx of Binge eating- possibly changing Adderall to Vyvance  Continue low carb dieting- be consistent  Fell off in October  Get back on track  Discussed Holiday eating- Meats first, planning to not go to [employer] to avoid it  Continue exercising as much as possible  Establish a routine  Continue Therapy weekly    Hold off on surgery for now  Labs  EKG  UGI -  Normal double-contrast upper GI study.  dietary consult- done  psych consult - dx with schizophrenia has shown stability for months- will need new     I will obtain the following clearances prior to surgery: none    Plan for Medical Weight Loss  Previous attempts  9/30/24   Stop taking OTC fat burner  Start Topiramate 25 mg BID- discussed side effects prior to taking    11/8/24  Only took Topiramate 25 mg BID for 2 days and too many side effects  Discuss with psychiatry if possibly change Adderall to Vyvanse for adhd and binge eating  Will ask pt to get more active and possible try other medications in 2 months, if dc'd OTC fat burner    : total time spent for visit: 22 minutes

## 2025-01-09 ENCOUNTER — TELEPHONE (OUTPATIENT)
Dept: BARIATRICS | Facility: CLINIC | Age: 30
End: 2025-01-09
Payer: MEDICARE

## 2025-01-09 NOTE — TELEPHONE ENCOUNTER
Message in appointment notes stating that the pt called and requested to have her appt changed due to Covid+. Called pt, rescheduled appt for 1st available. Pt agreed to new date and time.

## 2025-01-14 ENCOUNTER — TELEPHONE (OUTPATIENT)
Dept: OBSTETRICS AND GYNECOLOGY | Facility: CLINIC | Age: 30
End: 2025-01-14
Payer: MEDICARE

## 2025-01-14 NOTE — TELEPHONE ENCOUNTER
----- Message from [place] sent at 1/14/2025 12:02 PM CST -----  Contact: Self  Type:  RX Refill Request    Who Called:  Patient  Refill or New Rx:  New Rx  RX Name and Strength:  spironolactone (ALDACTONE) 50 MG tablet  How is the patient currently taking it? (ex. 1XDay):  AS Directed  Is this a 30 day or 90 day RX:  90  Preferred Pharmacy with phone number:    [facility] - [facility] [address] [address] [place] [address]  [address] [address] [address]  Phone: [phone] Fax: [phone]  Local or Mail Order:  Local  Ordering Provider:  Shelly Jaquez NP  Best Call Back Number:  [phone]  Additional Information:  Thank You

## 2025-01-15 ENCOUNTER — TELEPHONE (OUTPATIENT)
Dept: OBSTETRICS AND GYNECOLOGY | Facility: CLINIC | Age: 30
End: 2025-01-15
Payer: MEDICARE

## 2025-01-15 NOTE — TELEPHONE ENCOUNTER
Spoke with patient and patient stated she wanted to cancel her appointment for today and I got her rescheduled for a time that better suits her schedule

## 2025-01-30 ENCOUNTER — OFFICE VISIT (OUTPATIENT)
Dept: OBSTETRICS AND GYNECOLOGY | Facility: CLINIC | Age: 30
End: 2025-01-30
Payer: MEDICARE

## 2025-01-30 VITALS
HEIGHT: 63 IN | DIASTOLIC BLOOD PRESSURE: 76 MMHG | BODY MASS INDEX: 43.36 KG/M2 | WEIGHT: 244.69 LBS | SYSTOLIC BLOOD PRESSURE: 104 MMHG

## 2025-01-30 DIAGNOSIS — Z01.419 WELL WOMAN EXAM: Primary | ICD-10-CM

## 2025-01-30 DIAGNOSIS — N89.8 VAGINAL ODOR: ICD-10-CM

## 2025-01-30 DIAGNOSIS — E28.2 PCOS (POLYCYSTIC OVARIAN SYNDROME): ICD-10-CM

## 2025-01-30 PROCEDURE — 1160F RVW MEDS BY RX/DR IN RCRD: CPT | Mod: CPTII,S$GLB,,

## 2025-01-30 PROCEDURE — 99395 PREV VISIT EST AGE 18-39: CPT | Mod: S$GLB,,,

## 2025-01-30 PROCEDURE — 3078F DIAST BP <80 MM HG: CPT | Mod: CPTII,S$GLB,,

## 2025-01-30 PROCEDURE — 3074F SYST BP LT 130 MM HG: CPT | Mod: CPTII,S$GLB,,

## 2025-01-30 PROCEDURE — 1159F MED LIST DOCD IN RCRD: CPT | Mod: CPTII,S$GLB,,

## 2025-01-30 PROCEDURE — 3008F BODY MASS INDEX DOCD: CPT | Mod: CPTII,S$GLB,,

## 2025-01-30 PROCEDURE — 99999 PR PBB SHADOW E&M-EST. PATIENT-LVL III: CPT | Mod: PBBFAC,,,

## 2025-01-30 RX ORDER — ATOMOXETINE 18 MG/1
18 CAPSULE ORAL
COMMUNITY
Start: 2025-01-14

## 2025-01-30 RX ORDER — DROSPIRENONE AND ETHINYL ESTRADIOL 0.03MG-3MG
1 KIT ORAL DAILY
Qty: 90 TABLET | Refills: 3 | Status: SHIPPED | OUTPATIENT
Start: 2025-01-30 | End: 2026-01-30

## 2025-01-30 RX ORDER — METRONIDAZOLE 500 MG/1
500 TABLET ORAL EVERY 12 HOURS
Qty: 14 TABLET | Refills: 0 | Status: SHIPPED | OUTPATIENT
Start: 2025-01-30 | End: 2025-02-06

## 2025-01-30 RX ORDER — PROPRANOLOL HYDROCHLORIDE 20 MG/1
20 TABLET ORAL 2 TIMES DAILY
COMMUNITY
Start: 2024-11-19

## 2025-01-30 RX ORDER — SPIRONOLACTONE 50 MG/1
50 TABLET, FILM COATED ORAL DAILY
Qty: 30 TABLET | Refills: 11 | Status: SHIPPED | OUTPATIENT
Start: 2025-01-30 | End: 2026-01-30

## 2025-01-30 NOTE — PROGRESS NOTES
HISTORY OF PRESENT ILLNESS:    Tina Case is a 29 y.o. female, , Patient's last menstrual period was 2025 (approximate).,  presents for a routine annual exam . Started period yesterday and experiencing some vaginal odor    Last pap:  - NILM   Gardisil?: Yes  Last mammogram: n/a   Last colon ca screening:  n/a   Contraception: OCP (estrogen/progesterone).    Sexually transmitted infection risk: very low risk of STD exposure.   Menstrual flow: regular every 28-30 days.  This is the extent of the patient's complaints at this time.     Past Medical History:   Diagnosis Date    Allergy     Anxiety     Bipolar disorder     Schizophrenia     Sleep apnea, unspecified        Past Surgical History:   Procedure Laterality Date    BLADDER SURGERY      INCONTINENCE SURGERY         MEDICATIONS AND ALLERGIES:      Current Outpatient Medications:     atomoxetine (STRATTERA) 18 MG capsule, Take 18 mg by mouth., Disp: , Rfl:     busPIRone (BUSPAR) 15 MG tablet, Take 15 mg by mouth 2 (two) times daily., Disp: , Rfl:     dextroamphetamine-amphetamine (ADDERALL) 20 mg tablet, Take 1 tablet by mouth once daily., Disp: , Rfl:     escitalopram oxalate (LEXAPRO) 20 MG tablet, Take 1 tablet (20 mg total) by mouth every evening., Disp: , Rfl: 0    hydrOXYzine pamoate (VISTARIL) 50 MG Cap, Take 50 mg by mouth every evening., Disp: , Rfl:     metFORMIN (GLUCOPHAGE) 1000 MG tablet, Take 1 tablet (1,000 mg total) by mouth daily with breakfast., Disp: 30 tablet, Rfl: 2    propranoloL (INDERAL) 20 MG tablet, Take 20 mg by mouth 2 (two) times daily., Disp: , Rfl:     cetirizine (ZYRTEC) 10 MG tablet, Take 1 tablet (10 mg total) by mouth once daily., Disp: 30 tablet, Rfl: 2    drospirenone-ethinyl estradioL (MICHELLE, 28,) 3-0.03 mg per tablet, Take 1 tablet by mouth once daily., Disp: 90 tablet, Rfl: 3    metroNIDAZOLE (FLAGYL) 500 MG tablet, Take 1 tablet (500 mg total) by mouth every 12 (twelve) hours. for 7 days, Disp: 14  "tablet, Rfl: 0    spironolactone (ALDACTONE) 50 MG tablet, Take 1 tablet (50 mg total) by mouth once daily., Disp: 30 tablet, Rfl: 11    Review of patient's allergies indicates:  No Known Allergies    Family History   Problem Relation Name Age of Onset    Alcohol abuse Mother      Breast cancer Neg Hx      Ovarian cancer Neg Hx         Social History     Socioeconomic History    Marital status: Single   Tobacco Use    Smoking status: Never    Smokeless tobacco: Never   Substance and Sexual Activity    Alcohol use: No     Alcohol/week: 0.0 standard drinks of alcohol    Drug use: No    Sexual activity: Never     Birth control/protection: None       OB History    Para Term  AB Living   0             SAB IAB Ectopic Multiple Live Births                      COMPREHENSIVE GYN HISTORY:  PAP History: Denies abnormal Paps.  Infection History: Denies STDs. Denies PID.  Benign History: Denies uterine fibroids. Denies ovarian cysts. Denies endometriosis. Denies other conditions.  Cancer History: Denies cervical cancer. Denies uterine cancer or hyperplasia. Denies ovarian cancer. Denies vulvar cancer or pre-cancer. Denies vaginal cancer or pre-cancer. Denies breast cancer. Denies colon cancer.      ROS:  GENERAL: No weight changes. No swelling. No fatigue. No fever.  BREASTS: No pain. No lumps. No discharge.  ABDOMEN: No pain. No nausea. No vomiting. No diarrhea. No constipation.  REPRODUCTIVE: No abnormal bleeding. No pelvic pain.   VULVA: No pain. No lesions. No itching.  VAGINA: No relaxation. No itching. No odor. No discharge. No lesions.  URINARY: No incontinence. No nocturia. No frequency. No dysuria.    /76 (Patient Position: Sitting)   Ht 5' 3" (1.6 m)   Wt 111 kg (244 lb 11.4 oz)   LMP 2025 (Approximate)   BMI 43.35 kg/m²     PE:  Physical Exam:   Constitutional: She is oriented to person, place, and time. She appears well-developed and well-nourished. No distress.    HENT:   Head: " Normocephalic.    Eyes: Pupils are equal, round, and reactive to light.      Pulmonary/Chest: Effort normal. No respiratory distress. She exhibits no mass, no tenderness, no laceration, no edema, no deformity, no swelling and no retraction. Right breast exhibits no inverted nipple, no mass, no nipple discharge, no skin change, no tenderness, no bleeding and no swelling. Left breast exhibits no inverted nipple, no mass, no nipple discharge, no skin change, no tenderness, no bleeding and no swelling.        Abdominal: Soft. She exhibits no distension. There is no abdominal tenderness.     Genitourinary:    Inguinal canal, uterus, right adnexa and left adnexa normal.      Pelvic exam was performed with patient supine.   The external female genitalia was normal.     Labial bartholins normal.There is no rash, tenderness or lesion on the right labia. There is no rash, tenderness or lesion on the left labia. Cervix is normal. Vagina exhibits no lesion. There is bleeding in the vagina. No erythema, vaginal discharge or tenderness in the vagina.    No signs of injury in the vagina.             Musculoskeletal: Normal range of motion and moves all extremeties.       Neurological: She is alert and oriented to person, place, and time.    Skin: Skin is warm and dry.    Psychiatric: She has a normal mood and affect. Judgment and thought content normal.        PROCEDURES/ORDERS:  Pap- deferred, not due. Bleeding today      Assessment/Plan:    Well woman exam    PCOS (polycystic ovarian syndrome)  -     spironolactone (ALDACTONE) 50 MG tablet; Take 1 tablet (50 mg total) by mouth once daily.  Dispense: 30 tablet; Refill: 11  -     drospirenone-ethinyl estradioL (MICHELLE, 28,) 3-0.03 mg per tablet; Take 1 tablet by mouth once daily.  Dispense: 90 tablet; Refill: 3    Vaginal odor  -     metroNIDAZOLE (FLAGYL) 500 MG tablet; Take 1 tablet (500 mg total) by mouth every 12 (twelve) hours. for 7 days  Dispense: 14 tablet; Refill:  0        COUNSELING:  The patient was counseled today on:  -A.C.S. Pap and pelvic exam guidelines, recomendations for yearly mammogram, monthly self breast exams and to follow up with her PCP for other health maintenance.    FOLLOW-UP  annually for [employer].

## 2025-02-13 ENCOUNTER — PATIENT MESSAGE (OUTPATIENT)
Dept: BARIATRICS | Facility: CLINIC | Age: 30
End: 2025-02-13
Payer: MEDICARE

## 2025-02-14 ENCOUNTER — OFFICE VISIT (OUTPATIENT)
Dept: BARIATRICS | Facility: CLINIC | Age: 30
End: 2025-02-14
Payer: MEDICARE

## 2025-02-14 VITALS
HEIGHT: 63 IN | TEMPERATURE: 96 F | SYSTOLIC BLOOD PRESSURE: 122 MMHG | DIASTOLIC BLOOD PRESSURE: 71 MMHG | HEART RATE: 85 BPM | RESPIRATION RATE: 16 BRPM | BODY MASS INDEX: 42.88 KG/M2 | WEIGHT: 242 LBS

## 2025-02-14 DIAGNOSIS — R63.5 WEIGHT GAIN: ICD-10-CM

## 2025-02-14 DIAGNOSIS — E66.01 MORBID OBESITY: Primary | ICD-10-CM

## 2025-02-14 NOTE — PROGRESS NOTES
Medically Supervised Weight Loss Documentation    Date of Visit: 02/14/2025    Patient: Tina Case    Beginning Weight: 251    Last Weight: 232    Current Weight: 242  Current BMI: Body mass index is 42.87 kg/m².  Weight Change: -9 pounds          Vitals:   Vitals:    02/14/25 1454   BP: 122/71   Pulse: 85   Resp: 16   Temp: 96.2 °F (35.7 °C)         Comorbidities:   Past Medical History:   Diagnosis Date    Allergy     Anxiety     Bipolar disorder     Schizophrenia     Sleep apnea, unspecified        Medications:  Current Outpatient Medications on File Prior to Visit   Medication Sig Dispense Refill    atomoxetine (STRATTERA) 18 MG capsule Take 18 mg by mouth.      busPIRone (BUSPAR) 15 MG tablet Take 15 mg by mouth 2 (two) times daily.      cetirizine (ZYRTEC) 10 MG tablet Take 1 tablet (10 mg total) by mouth once daily. 30 tablet 2    dextroamphetamine-amphetamine (ADDERALL) 20 mg tablet Take 1 tablet by mouth once daily.      drospirenone-ethinyl estradioL (MICHELLE, 28,) 3-0.03 mg per tablet Take 1 tablet by mouth once daily. 90 tablet 3    escitalopram oxalate (LEXAPRO) 20 MG tablet Take 1 tablet (20 mg total) by mouth every evening.  0    hydrOXYzine pamoate (VISTARIL) 50 MG Cap Take 50 mg by mouth every evening.      metFORMIN (GLUCOPHAGE) 1000 MG tablet Take 1 tablet (1,000 mg total) by mouth daily with breakfast. 30 tablet 2    propranoloL (INDERAL) 20 MG tablet Take 20 mg by mouth 2 (two) times daily.      spironolactone (ALDACTONE) 50 MG tablet Take 1 tablet (50 mg total) by mouth once daily. 30 tablet 11     No current facility-administered medications on file prior to visit.         Body comp:  Fat Percent:  49 %  Fat Mass:  119 lb  FFM:  123 lb  TBW: 90 lb  TBW %:  37.4 %  BMR: 1787 kcal      Diet Education Discussed:  Stress at home- using food to cope, skipping few days  Breakfast:  eggs, or applesauce, oatmeal (protein shake) turkey klein, yogurt, carrot sticks, piece of toast  Snack: 1  "mandarin orange,   Lunch:  ham sandwich, can soup, easy to make   Snack: fruit, applesauce, green beans   Dinner:  fish/chicken/beef/pork- majority baked- vegetables, bell pepper, greens beans, carrots, cauliflower  Taste chaning- no longer liking Sushi  SF/Zero Calorie Sparkling/Flavored water- 3 (34 oz)  Tea+ Splenda  No energy drinks/soda  HX of Binge Eating- feels has improved. Biggest challenge is when out of house to stop for food. Feels like impulse has improved. Feel like "lost control" December to mid-January    Exercise/Activity Discussed:  Gym 3x/wk- 30 minutes weight  Pool or cycling for 30 minutes      MVI/ Herbal/Minerals:  Daily MVI  Vit D  B12        Behavior or Diet Goals for this patient:  Continue with Prescribed home medications  Tanita Scale reviewed:  Increased 3 lb fat  Increased 3 lb muscle  Increased 2 lb water  Medication Discussion   Did stop in November--Requested pt to Stop taking OTC fat burner in September, did not and now only taking 1 instead of 2  With Hx of Binge eating- possibly changing Adderall to Vyvance  Continue low carb dieting- be consistent  Fell off in Dec-January  Get back on track, increase protein  Continue exercising as much as possible  Establish a routine- continue with good progress  Continue Therapy weekly (Tuesday)    Hold off on surgery for now  Labs  EKG  UGI -  Normal double-contrast upper GI study.  dietary consult- done  psych consult - dx with schizophrenia has shown stability for months- will need new     I will obtain the following clearances prior to surgery: none    Plan for Medical Weight Loss  Previous attempts  9/30/24   Stop taking OTC fat burner  Start Topiramate 25 mg BID- discussed side effects prior to taking    11/8/24  Only took Topiramate 25 mg BID for 2 days and too many side effects  Discuss with psychiatry if possibly change Adderall to Vyvanse for adhd and binge eating  Will ask pt to get more active and possible try other medications in " 2 months, if dc'd OTC fat burner    2/14/25  Topiramate- gives brain fog  Recent admission to psych facility  Discuss with psychiatry if possibly change Adderall to Vyvanse for adhd and binge eating- pt does not want to change  Get back on track with diet plan, will discuss meds on next visit      : total time spent for visit: 16 minutes

## 2025-02-20 PROBLEM — F84.0 AUTISM SPECTRUM: Status: ACTIVE | Noted: 2025-02-20

## 2025-03-14 ENCOUNTER — OFFICE VISIT (OUTPATIENT)
Dept: BARIATRICS | Facility: CLINIC | Age: 30
End: 2025-03-14
Payer: MEDICARE

## 2025-03-14 VITALS
HEIGHT: 63 IN | RESPIRATION RATE: 16 BRPM | TEMPERATURE: 98 F | DIASTOLIC BLOOD PRESSURE: 78 MMHG | WEIGHT: 247.38 LBS | SYSTOLIC BLOOD PRESSURE: 127 MMHG | BODY MASS INDEX: 43.83 KG/M2 | HEART RATE: 67 BPM

## 2025-03-14 DIAGNOSIS — R63.5 WEIGHT GAIN: ICD-10-CM

## 2025-03-14 DIAGNOSIS — E66.01 MORBID OBESITY: Primary | ICD-10-CM

## 2025-03-14 PROCEDURE — 99999 PR PBB SHADOW E&M-EST. PATIENT-LVL IV: CPT | Mod: PBBFAC,,, | Performed by: NURSE PRACTITIONER

## 2025-03-14 RX ORDER — NALTREXONE HYDROCHLORIDE AND BUPROPION HYDROCHLORIDE 8; 90 MG/1; MG/1
2 TABLET, EXTENDED RELEASE ORAL 2 TIMES DAILY
Qty: 120 TABLET | Refills: 2 | Status: SHIPPED | OUTPATIENT
Start: 2025-03-14 | End: 2025-04-13

## 2025-03-14 NOTE — PROGRESS NOTES
Medically Supervised Weight Loss Documentation    Date of Visit: 03/14/2025    Patient: Tina Case    Beginning Weight: 251    Last Weight: 242    Current Weight: 247  Current BMI: Body mass index is 43.82 kg/m².  Weight Change: -4 pounds          Vitals:   Vitals:    03/14/25 1122   BP: 127/78   Pulse: 67   Resp: 16   Temp: 98.1 °F (36.7 °C)         Comorbidities:   Past Medical History:   Diagnosis Date    Allergy     Anxiety     Bipolar disorder     Schizophrenia     Sleep apnea, unspecified        Medications:  Current Outpatient Medications on File Prior to Visit   Medication Sig Dispense Refill    atomoxetine (STRATTERA) 18 MG capsule Take 18 mg by mouth.      busPIRone (BUSPAR) 15 MG tablet Take 15 mg by mouth 2 (two) times daily.      cetirizine (ZYRTEC) 10 MG tablet Take 1 tablet (10 mg total) by mouth once daily. 30 tablet 2    dextroamphetamine-amphetamine (ADDERALL) 20 mg tablet Take 1 tablet by mouth once daily.      drospirenone-ethinyl estradioL (MICHELLE, 28,) 3-0.03 mg per tablet Take 1 tablet by mouth once daily. 90 tablet 3    escitalopram oxalate (LEXAPRO) 20 MG tablet Take 1 tablet (20 mg total) by mouth every evening.  0    hydrOXYzine pamoate (VISTARIL) 50 MG Cap Take 50 mg by mouth every evening.      metFORMIN (GLUCOPHAGE) 1000 MG tablet Take 1 tablet (1,000 mg total) by mouth daily with breakfast. 30 tablet 2    propranoloL (INDERAL) 20 MG tablet Take 20 mg by mouth 2 (two) times daily.      semaglutide, weight loss, 0.25 mg/0.5 mL PnIj Inject 0.25 mg into the skin every 7 days. 0.5 mL 2    spironolactone (ALDACTONE) 50 MG tablet Take 1 tablet (50 mg total) by mouth once daily. 30 tablet 11     No current facility-administered medications on file prior to visit.         Body comp:  Fat Percent:  49.7 %  Fat Mass:  123 lb  FFM:  124.4 lb  TBW: 91.8 lb  TBW %:  37.1 %  BMR: 1811 kcal      Diet Education Discussed:  Stress at home- using food to cope, skipping few days  Breakfast:  2  "eggs with whole milk   Snack: 1 mandarin orange,   Lunch:  ham/cheese sandwich, can soup, "easy to make" with leftovers  Snack: fruit, applesauce, green beans   Dinner:  fish/chicken/beef/pork- majority baked- vegetables, bell pepper, greens beans, carrots, cauliflower  Taste chaning- no longer liking Sushi  SF/Zero Calorie Sparkling/Flavored water- 3 (34 oz)  Tea+ Splenda  No energy drinks/soda  HX of Binge Eating- feels has improved. Biggest challenge is when out of house to stop for food. Feels like impulse has improved. Feel like "lost control" December to mid-January    Exercise/Activity Discussed:  Gym 3x/wk- 30 minutes weight  Pool or cycling for 30 minutes  Total time 1 hr    MVI/ Herbal/Minerals:  Daily MVI  Vit D  B12        Behavior or Diet Goals for this patient:  Falls reviewed  Continue with Prescribed home medications  Tanita Scale reviewed:  Increased 4 lb fat  Increased 1 lb muscle  Increased 2 lb water  Do not exceed 1700 calories/day  Medication Discussion   With Hx of Binge eating- possibly changing Adderall to Vyvance- does not want to change at this time since Adderall working for her  Continue low carb dieting- be consistent  Fell off in Dec-January  Get back on track, increase protein  Continue exercising as much as possible  Establish a routine- continue with good progress  Continue Therapy weekly (Tuesday)    Hold off on surgery for now  Labs  EKG  UGI -  Normal double-contrast upper GI study.  dietary consult- done  psych consult - dx with schizophrenia has shown stability for months- will need new     I will obtain the following clearances prior to surgery: none    Plan for Medical Weight Loss  Previous attempts  9/30/24   Stop taking OTC fat burner  Start Topiramate 25 mg BID- discussed side effects prior to taking    11/8/24  Only took Topiramate 25 mg BID for 2 days and too many side effects  Discuss with psychiatry if possibly change Adderall to Vyvanse for adhd and binge eating  Will " ask pt to get more active and possible try other medications in 2 months, if dc'd OTC fat burner    2/14/25  Topiramate- gives brain fog  Recent admission to psych facility  Discuss with psychiatry if possibly change Adderall to Vyvanse for adhd and binge eating- pt does not want to change  Get back on track with diet plan, will discuss meds on next visit    3/14/2025  Opting for Contrave  Discussed ramping with dose over a month  Discussed out of pocket cost and need to use online coupon      : total time spent for visit: 18 minutes

## 2025-03-14 NOTE — PATIENT INSTRUCTIONS
Contrave    Week 1- 1 pill in AM  Week 2- I pill in AM & PM  Week 3- 2 pills in AM and 1 pill in PM  Week 4- 2 pills in AM and 2 pills in PM

## 2025-03-17 ENCOUNTER — TELEPHONE (OUTPATIENT)
Dept: BARIATRICS | Facility: CLINIC | Age: 30
End: 2025-03-17
Payer: MEDICARE

## 2025-03-17 NOTE — TELEPHONE ENCOUNTER
----- Message from Med Assistant Randolph sent at 3/14/2025  3:01 PM CDT -----    ----- Message -----  From: Kriss Cortés  Sent: 3/14/2025   3:00 PM CDT  To: Sushil SOTELO Staff    Type:  Needs Medical AdviceWho Called: the patientSymptoms (please be specific):How long has patient had these symptoms:  Pharmacy name and phone #:  [facility] - [facility] [address] [facility] [id number] [address] [address]Cdwdx: [phone] Fax: [phone] the patient rather a call back or a response via [facility]? call back/[facility] Call Back Number: [phone] Additional Information: Pt states insurance doesn't cover naltrexone-bupropion (CONTRAVE) 8-90 mg TbSRPA deniedPlease adviseThanks

## 2025-06-06 ENCOUNTER — PATIENT MESSAGE (OUTPATIENT)
Dept: BARIATRICS | Facility: CLINIC | Age: 30
End: 2025-06-06

## 2025-06-06 ENCOUNTER — OFFICE VISIT (OUTPATIENT)
Dept: BARIATRICS | Facility: CLINIC | Age: 30
End: 2025-06-06
Payer: MEDICARE

## 2025-06-06 VITALS
RESPIRATION RATE: 16 BRPM | BODY MASS INDEX: 44.23 KG/M2 | TEMPERATURE: 97 F | HEIGHT: 63 IN | WEIGHT: 249.63 LBS | DIASTOLIC BLOOD PRESSURE: 75 MMHG | HEART RATE: 67 BPM | SYSTOLIC BLOOD PRESSURE: 120 MMHG

## 2025-06-06 DIAGNOSIS — E66.01 MORBID OBESITY: Primary | ICD-10-CM

## 2025-06-06 DIAGNOSIS — G47.33 OBSTRUCTIVE SLEEP APNEA SYNDROME: ICD-10-CM

## 2025-06-06 DIAGNOSIS — E28.2 PCOS (POLYCYSTIC OVARIAN SYNDROME): ICD-10-CM

## 2025-06-06 DIAGNOSIS — K76.0 STEATOSIS OF LIVER: ICD-10-CM

## 2025-06-06 PROCEDURE — 1159F MED LIST DOCD IN RCRD: CPT | Mod: CPTII,S$GLB,, | Performed by: NURSE PRACTITIONER

## 2025-06-06 PROCEDURE — 99999 PR PBB SHADOW E&M-EST. PATIENT-LVL IV: CPT | Mod: PBBFAC,,, | Performed by: NURSE PRACTITIONER

## 2025-06-06 PROCEDURE — 99214 OFFICE O/P EST MOD 30 MIN: CPT | Mod: S$GLB,,, | Performed by: NURSE PRACTITIONER

## 2025-06-06 PROCEDURE — 1160F RVW MEDS BY RX/DR IN RCRD: CPT | Mod: CPTII,S$GLB,, | Performed by: NURSE PRACTITIONER

## 2025-06-06 PROCEDURE — 3078F DIAST BP <80 MM HG: CPT | Mod: CPTII,S$GLB,, | Performed by: NURSE PRACTITIONER

## 2025-06-06 PROCEDURE — 3008F BODY MASS INDEX DOCD: CPT | Mod: CPTII,S$GLB,, | Performed by: NURSE PRACTITIONER

## 2025-06-06 PROCEDURE — 3074F SYST BP LT 130 MM HG: CPT | Mod: CPTII,S$GLB,, | Performed by: NURSE PRACTITIONER

## 2025-06-06 RX ORDER — NALTREXONE HYDROCHLORIDE AND BUPROPION HYDROCHLORIDE 8; 90 MG/1; MG/1
2 TABLET, EXTENDED RELEASE ORAL 2 TIMES DAILY
Qty: 120 TABLET | Refills: 2 | Status: SHIPPED | OUTPATIENT
Start: 2025-06-06 | End: 2025-07-06

## 2025-06-06 NOTE — PROGRESS NOTES
"Medically Supervised Weight Loss Documentation    Date of Visit: 06/07/2025    Patient: Tina Case    Beginning Weight: 251    Last Weight: 247    Current Weight: 249  Current BMI: Body mass index is 44.21 kg/m².  Weight Change: -2 pounds          Vitals:   Vitals:    06/06/25 1112   BP: 120/75   Pulse: 67   Resp: 16   Temp: 97 °F (36.1 °C)           Comorbidities:   Past Medical History:   Diagnosis Date    Allergy     Anxiety     Bipolar disorder     Schizophrenia     Sleep apnea, unspecified        Medications:  Current Outpatient Medications on File Prior to Visit   Medication Sig Dispense Refill    atomoxetine (STRATTERA) 18 MG capsule Take 18 mg by mouth.      busPIRone (BUSPAR) 15 MG tablet Take 15 mg by mouth 2 (two) times daily.      cetirizine (ZYRTEC) 10 MG tablet Take 1 tablet (10 mg total) by mouth once daily. 30 tablet 2    dextroamphetamine-amphetamine (ADDERALL) 20 mg tablet Take 1 tablet by mouth once daily.      drospirenone-ethinyl estradioL (MICHELLE, 28,) 3-0.03 mg per tablet Take 1 tablet by mouth once daily. 90 tablet 3    escitalopram oxalate (LEXAPRO) 20 MG tablet Take 1 tablet (20 mg total) by mouth every evening.  0    hydrOXYzine pamoate (VISTARIL) 50 MG Cap Take 50 mg by mouth every evening.      metFORMIN (GLUCOPHAGE) 1000 MG tablet Take 1 tablet (1,000 mg total) by mouth daily with breakfast. 90 tablet 1    propranoloL (INDERAL) 20 MG tablet Take 20 mg by mouth 2 (two) times daily.      spironolactone (ALDACTONE) 50 MG tablet Take 1 tablet (50 mg total) by mouth once daily. 30 tablet 11     No current facility-administered medications on file prior to visit.         Body comp:  Fat Percent:  51.2 %  Fat Mass:  127 lb  FFM:  121.8 lb  TBW: 90.28 lb  TBW %:  36.1 %  BMR: 1787 kcal      Diet Education Discussed:  Stress at home- using food to cope, skipping few days  Breakfast:  2 eggs with whole milk   Snack: 1 mandarin orange,   Lunch:  ham/cheese sandwich, can soup, "easy to " "make" with leftovers  Snack: fruit, applesauce, green beans   Dinner:  fish/chicken/beef/pork- majority baked- vegetables, bell pepper, greens beans, carrots, cauliflower  Taste chaning- no longer liking Sushi  SF/Zero Calorie Sparkling/Flavored water- 3 (34 oz)  Tea+ Splenda  No energy drinks/soda          Exercise/Activity Discussed:  Gym 3x/wk- 30 minutes weight  cycling for 30 minutes  Total time 1 hr    MVI/ Herbal/Minerals:  Daily MVI  Vit D  B12    Behavior or Diet Goals for this patient:  Falls reviewed  Continue with Prescribed home medications  Tanita Scale reviewed:  Increased 4 lb fat  Decreased 3 lb muscle  Increased 1 lb water  Do not exceed 1700 calories/day  Medication Discussion   With Hx of Binge eating- possibly changing Adderall to Vyvance- does not want to change at this time since Adderall working for her  Continue low carb dieting- be consistent  Fell off in Dec-January 2024  Get back on track, increase protein  Continue exercising as much as possible  Establish a routine- continue with good progress  Continue Therapy weekly (Tuesday)    Hold off on surgery for now  Labs  EKG  UGI -  Normal double-contrast upper GI study.  dietary consult- done  psych consult - dx with schizophrenia has shown stability for months- will need new     I will obtain the following clearances prior to surgery: none    Plan for Medical Weight Loss  Previous attempts  9/30/24   Stop taking OTC fat burner  Start Topiramate 25 mg BID- discussed side effects prior to taking    11/8/24  Only took Topiramate 25 mg BID for 2 days and too many side effects  Discuss with psychiatry if possibly change Adderall to Vyvanse for adhd and binge eating  Will ask pt to get more active and possible try other medications in 2 months, if dc'd OTC fat burner    2/14/25  HX of Binge Eating- feels has improved. Biggest challenge is when out of house to stop for food. Feels like impulse has improved. Feel like "lost control" December '24 to " mid-January '25  Topiramate- gives brain fog  Recent admission to psych facility  Discuss with psychiatry if possibly change Adderall to Vyvanse for adhd and binge eating- pt does not want to change  Get back on track with diet plan, will discuss meds on next visit    3/14/2025  Opting for Contrave  Discussed ramping with dose over a month  Discussed out of pocket cost and need to use online coupon-  Wanted to get back on track on own and then try contrave    Changes for today, 6/6/2025  Increased to 260 in May with falling off wagon  Reporting Back to exercise with 10 miles/d  Contrave Rx given today, discussed ramping again  Contrave coupon download- previous information in [employer]  If not work, then change to amphetamine/phentermine    Co morbidities:     [unfilled]

## 2025-07-16 PROBLEM — J45.909 ACUTE ASTHMATIC BRONCHITIS: Status: ACTIVE | Noted: 2025-07-16

## 2025-08-21 ENCOUNTER — TELEPHONE (OUTPATIENT)
Dept: BARIATRICS | Facility: CLINIC | Age: 30
End: 2025-08-21
Payer: MEDICARE

## 2025-08-22 ENCOUNTER — OFFICE VISIT (OUTPATIENT)
Dept: BARIATRICS | Facility: CLINIC | Age: 30
End: 2025-08-22
Payer: MEDICARE

## 2025-08-22 VITALS — BODY MASS INDEX: 44.83 KG/M2 | WEIGHT: 253 LBS | HEIGHT: 63 IN

## 2025-08-22 DIAGNOSIS — Z71.3 ENCOUNTER FOR DIETARY COUNSELING AND SURVEILLANCE: ICD-10-CM

## 2025-08-22 DIAGNOSIS — G47.33 OBSTRUCTIVE SLEEP APNEA SYNDROME: ICD-10-CM

## 2025-08-22 DIAGNOSIS — K76.0 STEATOSIS OF LIVER: ICD-10-CM

## 2025-08-22 DIAGNOSIS — E28.2 PCOS (POLYCYSTIC OVARIAN SYNDROME): ICD-10-CM

## 2025-08-22 DIAGNOSIS — E66.01 MORBID OBESITY: Primary | ICD-10-CM

## 2025-08-25 ENCOUNTER — TELEPHONE (OUTPATIENT)
Dept: BARIATRICS | Facility: CLINIC | Age: 30
End: 2025-08-25
Payer: MEDICARE